# Patient Record
Sex: MALE | Race: WHITE | Employment: UNEMPLOYED | URBAN - METROPOLITAN AREA
[De-identification: names, ages, dates, MRNs, and addresses within clinical notes are randomized per-mention and may not be internally consistent; named-entity substitution may affect disease eponyms.]

---

## 2018-07-18 ENCOUNTER — OFFICE VISIT - HEALTHEAST (OUTPATIENT)
Dept: INTERNAL MEDICINE | Facility: CLINIC | Age: 38
End: 2018-07-18

## 2018-07-18 DIAGNOSIS — Z79.899 MEDICAL CANNABIS USE: ICD-10-CM

## 2018-07-18 DIAGNOSIS — G57.71 COMPLEX REGIONAL PAIN SYNDROME TYPE 2 OF RIGHT LOWER EXTREMITY: ICD-10-CM

## 2018-07-18 ASSESSMENT — MIFFLIN-ST. JEOR: SCORE: 1537.42

## 2018-09-20 ENCOUNTER — COMMUNICATION - HEALTHEAST (OUTPATIENT)
Dept: INTERNAL MEDICINE | Facility: CLINIC | Age: 38
End: 2018-09-20

## 2020-03-01 ENCOUNTER — HEALTH MAINTENANCE LETTER (OUTPATIENT)
Age: 40
End: 2020-03-01

## 2020-12-14 ENCOUNTER — HEALTH MAINTENANCE LETTER (OUTPATIENT)
Age: 40
End: 2020-12-14

## 2021-04-18 ENCOUNTER — HEALTH MAINTENANCE LETTER (OUTPATIENT)
Age: 41
End: 2021-04-18

## 2021-06-01 VITALS — HEIGHT: 68 IN | BODY MASS INDEX: 22.58 KG/M2 | WEIGHT: 149 LBS
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
## 2021-10-02 ENCOUNTER — HEALTH MAINTENANCE LETTER (OUTPATIENT)
Age: 41
End: 2021-10-02

## 2022-05-14 ENCOUNTER — HEALTH MAINTENANCE LETTER (OUTPATIENT)
Age: 42
End: 2022-05-14

## 2022-09-03 ENCOUNTER — HEALTH MAINTENANCE LETTER (OUTPATIENT)
Age: 42
End: 2022-09-03

## 2023-06-03 ENCOUNTER — HEALTH MAINTENANCE LETTER (OUTPATIENT)
Age: 43
End: 2023-06-03

## 2023-11-06 ENCOUNTER — HOSPITAL ENCOUNTER (EMERGENCY)
Facility: CLINIC | Age: 43
Discharge: LEFT BEFORE DISCHARGE | End: 2023-11-06
Attending: FAMILY MEDICINE | Admitting: FAMILY MEDICINE
Payer: COMMERCIAL

## 2023-11-06 ENCOUNTER — APPOINTMENT (OUTPATIENT)
Dept: GENERAL RADIOLOGY | Facility: CLINIC | Age: 43
End: 2023-11-06
Attending: FAMILY MEDICINE
Payer: COMMERCIAL

## 2023-11-06 VITALS
TEMPERATURE: 97.6 F | SYSTOLIC BLOOD PRESSURE: 154 MMHG | WEIGHT: 170 LBS | HEIGHT: 69 IN | OXYGEN SATURATION: 98 % | HEART RATE: 77 BPM | BODY MASS INDEX: 25.18 KG/M2 | DIASTOLIC BLOOD PRESSURE: 101 MMHG

## 2023-11-06 DIAGNOSIS — R06.00 DYSPNEA, UNSPECIFIED TYPE: ICD-10-CM

## 2023-11-06 DIAGNOSIS — Z53.29 LEFT AGAINST MEDICAL ADVICE: ICD-10-CM

## 2023-11-06 PROBLEM — G47.01 INSOMNIA DUE TO MEDICAL CONDITION: Status: ACTIVE | Noted: 2019-03-01

## 2023-11-06 PROBLEM — G56.03 BILATERAL CARPAL TUNNEL SYNDROME: Status: ACTIVE | Noted: 2020-03-27

## 2023-11-06 PROBLEM — G25.81 RESTLESS LEGS: Status: ACTIVE | Noted: 2019-03-01

## 2023-11-06 PROBLEM — M19.041 OSTEOARTHRITIS OF FINGERS OF BOTH HANDS: Status: ACTIVE | Noted: 2021-07-01

## 2023-11-06 PROBLEM — Z79.899 MEDICAL CANNABIS USE: Status: ACTIVE | Noted: 2018-07-18

## 2023-11-06 PROBLEM — F33.9 RECURRENT MAJOR DEPRESSION (H): Status: ACTIVE | Noted: 2023-11-06

## 2023-11-06 PROBLEM — G89.4 CHRONIC PAIN DISORDER: Status: ACTIVE | Noted: 2019-03-01

## 2023-11-06 PROBLEM — G57.71 COMPLEX REGIONAL PAIN SYNDROME TYPE 2 OF RIGHT LOWER EXTREMITY: Status: ACTIVE | Noted: 2018-07-18

## 2023-11-06 PROBLEM — M19.042 OSTEOARTHRITIS OF FINGERS OF BOTH HANDS: Status: ACTIVE | Noted: 2021-07-01

## 2023-11-06 LAB
ALBUMIN SERPL BCG-MCNC: 4.7 G/DL (ref 3.5–5.2)
ALP SERPL-CCNC: 129 U/L (ref 40–129)
ALT SERPL W P-5'-P-CCNC: 31 U/L (ref 0–70)
ANION GAP SERPL CALCULATED.3IONS-SCNC: 8 MMOL/L (ref 7–15)
AST SERPL W P-5'-P-CCNC: 26 U/L (ref 0–45)
BASOPHILS # BLD AUTO: 0.1 10E3/UL (ref 0–0.2)
BASOPHILS NFR BLD AUTO: 0 %
BILIRUB SERPL-MCNC: 0.3 MG/DL
BUN SERPL-MCNC: 9.2 MG/DL (ref 6–20)
CALCIUM SERPL-MCNC: 8.8 MG/DL (ref 8.6–10)
CHLORIDE SERPL-SCNC: 99 MMOL/L (ref 98–107)
CREAT SERPL-MCNC: 0.73 MG/DL (ref 0.67–1.17)
DEPRECATED HCO3 PLAS-SCNC: 28 MMOL/L (ref 22–29)
EGFRCR SERPLBLD CKD-EPI 2021: >90 ML/MIN/1.73M2
EOSINOPHIL # BLD AUTO: 0.3 10E3/UL (ref 0–0.7)
EOSINOPHIL NFR BLD AUTO: 3 %
ERYTHROCYTE [DISTWIDTH] IN BLOOD BY AUTOMATED COUNT: 12.7 % (ref 10–15)
FLUAV RNA SPEC QL NAA+PROBE: NEGATIVE
FLUBV RNA RESP QL NAA+PROBE: NEGATIVE
GLUCOSE SERPL-MCNC: 82 MG/DL (ref 70–99)
HCT VFR BLD AUTO: 44.5 % (ref 40–53)
HGB BLD-MCNC: 14.8 G/DL (ref 13.3–17.7)
HOLD SPECIMEN: NORMAL
IMM GRANULOCYTES # BLD: 0 10E3/UL
IMM GRANULOCYTES NFR BLD: 0 %
LYMPHOCYTES # BLD AUTO: 2 10E3/UL (ref 0.8–5.3)
LYMPHOCYTES NFR BLD AUTO: 18 %
MCH RBC QN AUTO: 32 PG (ref 26.5–33)
MCHC RBC AUTO-ENTMCNC: 33.3 G/DL (ref 31.5–36.5)
MCV RBC AUTO: 96 FL (ref 78–100)
MONOCYTES # BLD AUTO: 0.9 10E3/UL (ref 0–1.3)
MONOCYTES NFR BLD AUTO: 8 %
NEUTROPHILS # BLD AUTO: 8 10E3/UL (ref 1.6–8.3)
NEUTROPHILS NFR BLD AUTO: 71 %
NRBC # BLD AUTO: 0 10E3/UL
NRBC BLD AUTO-RTO: 0 /100
PLATELET # BLD AUTO: 364 10E3/UL (ref 150–450)
POTASSIUM SERPL-SCNC: 4 MMOL/L (ref 3.4–5.3)
PROT SERPL-MCNC: 7.6 G/DL (ref 6.4–8.3)
RBC # BLD AUTO: 4.62 10E6/UL (ref 4.4–5.9)
RSV RNA SPEC NAA+PROBE: NEGATIVE
SARS-COV-2 RNA RESP QL NAA+PROBE: NEGATIVE
SODIUM SERPL-SCNC: 135 MMOL/L (ref 135–145)
TROPONIN T SERPL HS-MCNC: 17 NG/L
WBC # BLD AUTO: 11.3 10E3/UL (ref 4–11)

## 2023-11-06 PROCEDURE — 99284 EMERGENCY DEPT VISIT MOD MDM: CPT | Mod: 25 | Performed by: FAMILY MEDICINE

## 2023-11-06 PROCEDURE — 84484 ASSAY OF TROPONIN QUANT: CPT | Performed by: FAMILY MEDICINE

## 2023-11-06 PROCEDURE — 99285 EMERGENCY DEPT VISIT HI MDM: CPT | Mod: 25

## 2023-11-06 PROCEDURE — 36415 COLL VENOUS BLD VENIPUNCTURE: CPT | Performed by: FAMILY MEDICINE

## 2023-11-06 PROCEDURE — 85025 COMPLETE CBC W/AUTO DIFF WBC: CPT | Performed by: FAMILY MEDICINE

## 2023-11-06 PROCEDURE — 80053 COMPREHEN METABOLIC PANEL: CPT | Performed by: FAMILY MEDICINE

## 2023-11-06 PROCEDURE — 93010 ELECTROCARDIOGRAM REPORT: CPT | Performed by: FAMILY MEDICINE

## 2023-11-06 PROCEDURE — 87637 SARSCOV2&INF A&B&RSV AMP PRB: CPT | Performed by: FAMILY MEDICINE

## 2023-11-06 PROCEDURE — 71045 X-RAY EXAM CHEST 1 VIEW: CPT

## 2023-11-06 PROCEDURE — 93005 ELECTROCARDIOGRAM TRACING: CPT

## 2023-11-06 ASSESSMENT — ACTIVITIES OF DAILY LIVING (ADL): ADLS_ACUITY_SCORE: 35

## 2023-11-06 NOTE — ED PROVIDER NOTES
History     Chief Complaint   Patient presents with    Shortness of Breath    Head Injury     HPI  Kayy Vieyra is a 43 year old male, past medical history is significant for chronic pain disorder, insomnia, restless legs, complex regional pain syndrome type II right lower extremity, medical cannabis use, chronic neck pain, tobacco abuse carpal tunnel syndrome, presents to the emergency department concerns of shortness of breath.  History is obtained from the patient presents with significant other.  He reports the onset of right-sided chest pain and shortness of breath beginning yesterday after doing some minimally exerting work at home.  No associated nausea or vomiting.  Some lightheadedness.  Significant other states that he used his inhaler at home a few times yesterday and it did not seem to help, he would not come in last night when she tried to make him.  He slept on the couch and apparently when sleeping fell off the couch and abraded the right side of his forehead without loss of consciousness.  He has not tried any medication for the pain in his right chest.  He reports that he does get occasional attacks where he gets short of breath and gets right-sided chest pain and states that its been suspected to be related to diagnosis of COPD.  He notes no change in baseline cough.  Denies any fever chills or sweats.  No change in sputum productivity.  Unfortunately he continues to smoke.      Allergies:  No Known Allergies    Problem List:    Patient Active Problem List    Diagnosis Date Noted    Osteoarthritis of fingers of both hands 07/01/2021     Priority: Medium    Bilateral carpal tunnel syndrome 03/27/2020     Priority: Medium    Chronic pain disorder 03/01/2019     Priority: Medium    Insomnia due to medical condition 03/01/2019     Priority: Medium    Restless legs 03/01/2019     Priority: Medium    Complex regional pain syndrome type 2 of right lower extremity 07/18/2018     Priority: Medium     Medical cannabis use 07/18/2018     Priority: Medium     Formatting of this note might be different from the original. Certified today      Chronic neck pain 07/01/2014     Priority: Medium    Vertebral compression fracture (H) 07/01/2014     Priority: Medium     Formatting of this note might be different from the original. Thoracic      Tobacco abuse: 1 ppd since 1993 10/03/2011     Priority: Medium    S/P appendectomy: July 15, 2011 10/03/2011     Priority: Medium    Lipoma of skin: on the back and arm 10/03/2011     Priority: Medium    Hand pain and numbness, bilateral 10/03/2011     Priority: Medium    CTS (carpal tunnel syndrome) 10/03/2011     Priority: Medium        Past Medical History:    No past medical history on file.    Past Surgical History:    Past Surgical History:   Procedure Laterality Date    APPENDECTOMY OPEN  July 2011    lipoma removal         Family History:    Family History   Problem Relation Age of Onset    Arthritis Mother         RA    Unknown/Adopted Maternal Grandmother     Unknown/Adopted Maternal Grandfather     Unknown/Adopted Paternal Grandmother     Unknown/Adopted Paternal Grandfather     Asthma No family hx of     C.A.D. No family hx of     Diabetes No family hx of     Hypertension No family hx of     Cerebrovascular Disease No family hx of     Breast Cancer No family hx of     Cancer - colorectal No family hx of     Prostate Cancer No family hx of     Alzheimer Disease No family hx of     Blood Disease No family hx of     Cancer No family hx of     Cardiovascular No family hx of     Circulatory No family hx of     Eye Disorder No family hx of     Gastrointestinal Disease No family hx of     Genitourinary Problems No family hx of     Heart Disease No family hx of     Lipids No family hx of     Musculoskeletal Disorder No family hx of     Neurologic Disorder No family hx of     Respiratory No family hx of     Thyroid Disease No family hx of        Social History:  Marital Status:   "Single [1]  Social History     Tobacco Use    Smoking status: Every Day     Packs/day: 1     Types: Cigarettes    Smokeless tobacco: Never    Tobacco comments:     1 pack per day   Substance Use Topics    Alcohol use: No    Drug use: No        Medications:    hydrocodone-acetaminophen 5-325 MG per tablet  Naproxen Sodium (ALEVE PO)          Review of Systems   All other systems reviewed and are negative.      Physical Exam   BP: (!) 154/101  Pulse: 77  Temp: 97.6  F (36.4  C)  Height: 175.3 cm (5' 9\")  Weight: 77.1 kg (170 lb)  SpO2: 98 %      Physical Exam  Vitals and nursing note reviewed.   Constitutional:       General: He is not in acute distress.     Appearance: He is well-developed and normal weight. He is not ill-appearing.   HENT:      Head: Normocephalic and atraumatic.      Mouth/Throat:      Mouth: Mucous membranes are moist.      Pharynx: Oropharynx is clear.   Eyes:      Extraocular Movements: Extraocular movements intact.      Pupils: Pupils are equal, round, and reactive to light.   Cardiovascular:      Rate and Rhythm: Normal rate and regular rhythm.   Pulmonary:      Effort: Pulmonary effort is normal.      Breath sounds: Normal breath sounds.   Chest:      Chest wall: Tenderness present.          Comments: Tenderness to palpation without crepitance in the area demarcated.  Musculoskeletal:         General: Normal range of motion.      Cervical back: Normal range of motion and neck supple.   Skin:     General: Skin is warm and dry.      Capillary Refill: Capillary refill takes less than 2 seconds.   Neurological:      General: No focal deficit present.      Mental Status: He is alert and oriented to person, place, and time.   Psychiatric:         Mood and Affect: Mood normal.         Behavior: Behavior normal.         ED Course                 Procedures              EKG Interpretation:      Interpreted by Jus Rocha MD  Time reviewed:              Results for orders placed or performed " during the hospital encounter of 11/06/23 (from the past 24 hour(s))   Symptomatic Influenza A/B, RSV, & SARS-CoV2 PCR (COVID-19) Nose    Specimen: Nose; Swab   Result Value Ref Range    Influenza A PCR Negative Negative    Influenza B PCR Negative Negative    RSV PCR Negative Negative    SARS CoV2 PCR Negative Negative    Narrative    Testing was performed using the Xpert Xpress CoV2/Flu/RSV Assay on the [employer] GeneXpert Instrument. This test should be ordered for the detection of SARS-CoV-2, influenza, and RSV viruses in individuals who meet clinical and/or epidemiological criteria. Test performance is unknown in asymptomatic patients. This test is for in vitro diagnostic use under the FDA EUA for laboratories certified under CLIA to perform high or moderate complexity testing. This test has not been FDA cleared or approved. A negative result does not rule out the presence of PCR inhibitors in the specimen or target RNA in concentration below the limit of detection for the assay. If only one viral target is positive but coinfection with multiple targets is suspected, the sample should be re-tested with another FDA cleared, approved, or authorized test, if coinfection would change clinical management. This test was validated by the [facility]. These laboratories are certified under the Clinical Laboratory Improvement Amendments of 1988 (CLIA-88) as qualified to perform high complexity laboratory testing.   CBC with platelets, differential    Narrative    The following orders were created for panel order CBC with platelets, differential.  Procedure                               Abnormality         Status                     ---------                               -----------         ------                     CBC with platelets and d...[[id number]]  Abnormal            Final result                 Please view results for these tests on the individual orders.   Comprehensive metabolic panel    Result Value Ref Range    Sodium 135 135 - 145 mmol/L    Potassium 4.0 3.4 - 5.3 mmol/L    Carbon Dioxide (CO2) 28 22 - 29 mmol/L    Anion Gap 8 7 - 15 mmol/L    Urea Nitrogen 9.2 6.0 - 20.0 mg/dL    Creatinine 0.73 0.67 - 1.17 mg/dL    GFR Estimate >90 >60 mL/min/1.73m2    Calcium 8.8 8.6 - 10.0 mg/dL    Chloride 99 98 - 107 mmol/L    Glucose 82 70 - 99 mg/dL    Alkaline Phosphatase 129 40 - 129 U/L    AST 26 0 - 45 U/L    ALT 31 0 - 70 U/L    Protein Total 7.6 6.4 - 8.3 g/dL    Albumin 4.7 3.5 - 5.2 g/dL    Bilirubin Total 0.3 <=1.2 mg/dL   Troponin T, High Sensitivity   Result Value Ref Range    Troponin T, High Sensitivity 17 <=22 ng/L   CBC with platelets and differential   Result Value Ref Range    WBC Count 11.3 (H) 4.0 - 11.0 10e3/uL    RBC Count 4.62 4.40 - 5.90 10e6/uL    Hemoglobin 14.8 13.3 - 17.7 g/dL    Hematocrit 44.5 40.0 - 53.0 %    MCV 96 78 - 100 fL    MCH 32.0 26.5 - 33.0 pg    MCHC 33.3 31.5 - 36.5 g/dL    RDW 12.7 10.0 - 15.0 %    Platelet Count 364 150 - 450 10e3/uL    % Neutrophils 71 %    % Lymphocytes 18 %    % Monocytes 8 %    % Eosinophils 3 %    % Basophils 0 %    % Immature Granulocytes 0 %    NRBCs per 100 WBC 0 <1 /100    Absolute Neutrophils 8.0 1.6 - 8.3 10e3/uL    Absolute Lymphocytes 2.0 0.8 - 5.3 10e3/uL    Absolute Monocytes 0.9 0.0 - 1.3 10e3/uL    Absolute Eosinophils 0.3 0.0 - 0.7 10e3/uL    Absolute Basophils 0.1 0.0 - 0.2 10e3/uL    Absolute Immature Granulocytes 0.0 <=0.4 10e3/uL    Absolute NRBCs 0.0 10e3/uL   [facility] Draw    Narrative    The following orders were created for panel order [facility] Draw.  Procedure                               Abnormality         Status                     ---------                               -----------         ------                     Extra Blue Top Tube[[id number]]                              Final result                 Please view results for these tests on the individual orders.   Extra Blue Top Tube   Result Value Ref Range     Hold Specimen [facility]    XR Chest Port 1 View    Narrative    XR CHEST PORT 1 VIEW 11/6/2023 1:35 PM    HISTORY: Shortness of air    COMPARISON: None.      Impression    IMPRESSION: Low lung volumes. 1.1 cm left basilar nodular opacity is  likely an incidental nipple shadow. Recommend nonemergent x-ray  follow-up with nipple markers. The lungs are otherwise clear. No  pneumonic consolidation or pleural effusion. Normal heart size. Spinal  degenerative changes.    LEANNA MYERS MD         SYSTEM ID:  [id number]     2:23 PM  Informed by the patient's nurse that the patient needs to leave.  He is demanding that his IV be removed and he wants to go right now.    Medications - No data to display    Assessments & Plan (with Medical Decision Making)     I have reviewed the nursing notes.    I have reviewed the findings, diagnosis, plan and need for follow up with the patient.          New Prescriptions    No medications on file       Final diagnoses:   Dyspnea, unspecified type   Left against medical advice       11/6/2023   [facility] EMERGENCY DEPT       Jus Rocha MD  11/15/23 5318

## 2023-11-06 NOTE — ED NOTES
Patient reports feeling depressed, hopeless. Is not taking any medications for this and would like this also addressed today. Denies suicidal ideation or homicidal ideation.

## 2023-11-06 NOTE — ED TRIAGE NOTES
Patient reports hx of copd diagnosis a year ago, last night had sudden onset of shortness of breath. Today fell of couch while sleeping and has hematoma and abrasion to right side of forehead. Patient also reports right sided chest pain.     Triage Assessment (Adult)       Row Name 11/06/23 5304          Triage Assessment    Airway WDL WDL        Respiratory WDL    Respiratory WDL X;rhythm/pattern     Rhythm/Pattern, Respiratory shortness of breath        Skin Circulation/Temperature WDL    Skin Circulation/Temperature WDL WDL        Cardiac WDL    Cardiac WDL WDL        Peripheral/Neurovascular WDL    Peripheral Neurovascular WDL WDL        Cognitive/Neuro/Behavioral WDL    Cognitive/Neuro/Behavioral WDL WDL

## 2023-11-06 NOTE — DISCHARGE INSTRUCTIONS
Aftercare Plan      Recommendations:  Coordinators from [facility] ([facility]) will be calling you in the next 24-48 hours to ensure that you have the resources you need. For additonal need of mental health services, you can also contact [facility] coordinators directly at [phone].    1) Continue to follow up with established providers for individual therapy and marriage and family therapy.  2) [facility] has scheduled patient for psychiatry. Please call to confirm the appointment and verify insurance.  3) Call crisis lines ([phone]) if mental health symptoms worsen, including suicidal ideation.  4) Mental Health symptoms and firearms create a high risk situation for harm to self or others. [facility] recommends patient to have someone outside of the home, (family or friend) to take custody of of firearms for the duration of mental health crisis.     Scheduled Appointments:    Date: Wednesday, 11/8/2023  Time: 9:00 am - 10:00 am  Provider: Stoney ROSAS  CNP,RN  Location: [facility], [address], [address], [place] [address]  Phone: [phone]  Type: Telepsychiatry    Appointment Instructions  Please make sure patient has a device (smartphone, tablet, computer, etc.) that can handle video calls. Once patient is put on schedule, ask them to fill New Patient Form by using following link: [email or web address] at least 24 hours prior to appointment.  Please fill New Patient Form by using following link. All forms need to be completed 24hours prior to the appointment date/time by going to [email or web address] Please call us on [phone] 24 hours prior to your scheduled appointment to confirm that you are able to attend.    If I am feeling unsafe or I am in a crisis, I will:    - Contact my established care providers   - Call the National Suicide Prevention Lifeline: 652.725.4091   - [place] CRISIS TEXT Line 100378  - Go to the nearest emergency room   - Continue  reaching out for help from others  - Call 911 or 988 or 211    Warning signs that I or other people might notice when a crisis is developing for me:    - feeling sudden increase in frustration, anger, or irritation  - persistent worsening of depression and anxiety  - extreme feelings of shame/guilt or feeling like a burden to others  - feeling like there is no way out  - intrusive thoughts of suicide or self harm    Things that help me to feel better:  - engaging in personal hobby or interests  - spending quality time with friends or family  - listen to soothing music     People and social settings that provide distraction:  - spending quality time with trusted friend  - asking for help as needed. Talking with recovery sponsor  - attending NA recovery meetings    Things I can use or do for distraction:   - watching a move or TV  - reading a book  - listening to music  - take a nature walk    People whom I can ask for help:  - trusted family member, coworker, or friend  - Crisis   - outpatient providers  - sponsor or NA friends    Your [facility] has a mental health crisis team you can call 24/7: [facility] Crisis  [phone]    Text [place] to 412373 to be connected with mental health crisis support.    [facility]  Are you an adult needing support? Talk to a specialist who has firsthand experience living with a mental health condition: Call: [phone]    Text: SUPPORT TO 18656     [place] [facility]  [email or web address]  [address] [place] [address]  [phone]    [facility]  Toll Free: [phone]  Direct: [phone]    Crisis Lines  Crisis Text Line  Text 829336  You will be connected with a trained live crisis counselor to provide support.    Por espanol, texto  AMBREEN a 264152 o texto a 442-AYUDAME en [facility] Hope Line  1.800.SUICIDE [3618426]      Additional Information  Today you were seen by a licensed mental health  professional through Triage and Transition services, [facility] ([facility])  for a crisis assessment in the Emergency Department at [facility].  It is recommended that you follow up with your established providers (psychiatrist, mental health therapist, and/or primary care doctor - as relevant) as soon as possible.     Coordinators from [facility] will be calling you in the next 24-48 hours to ensure that you have the resources you need.  You can also contact [facility] coordinators directly at [phone]. You may have been scheduled for or offered an appointment with a mental health provider. [facility] maintains an extensive network of licensed behavioral health providers to connect patients with the services they need.  We do not charge providers a fee to participate in our referral network.  We match patients with providers based on a patient's specific needs, insurance coverage, and location.  Our first effort will be to refer you to a provider within your care system, and will utilize providers outside your care system as needed.

## 2023-11-06 NOTE — ED NOTES
"Patient out to desk upset, stated \"I want this IV out now, or I will take it out myself, and I want to go!\" This RN called patient assigned RN in addition to MD -Dr Rocha, patient okay to be discharged per MD. IV removed and patient given discharge instructions. Patient apologized to this RN for \"being rude and abrupt, patient left with family member.  "
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
## 2023-11-07 ENCOUNTER — HOSPITAL ENCOUNTER (EMERGENCY)
Facility: CLINIC | Age: 43
Discharge: STILL ADMITTED OR ADMINISTRATIVE | End: 2023-11-07
Payer: COMMERCIAL

## 2023-11-11 ENCOUNTER — HOSPITAL ENCOUNTER (EMERGENCY)
Facility: CLINIC | Age: 43
Discharge: HOME | End: 2023-11-11
Attending: EMERGENCY MEDICINE | Admitting: EMERGENCY MEDICINE
Payer: COMMERCIAL

## 2023-11-11 ENCOUNTER — APPOINTMENT (OUTPATIENT)
Dept: GENERAL RADIOLOGY | Facility: CLINIC | Age: 43
End: 2023-11-11
Attending: EMERGENCY MEDICINE
Payer: COMMERCIAL

## 2023-11-11 VITALS
DIASTOLIC BLOOD PRESSURE: 70 MMHG | HEART RATE: 75 BPM | TEMPERATURE: 97.7 F | RESPIRATION RATE: 15 BRPM | HEIGHT: 69 IN | OXYGEN SATURATION: 97 % | WEIGHT: 162 LBS | BODY MASS INDEX: 23.99 KG/M2 | SYSTOLIC BLOOD PRESSURE: 115 MMHG

## 2023-11-11 DIAGNOSIS — R07.9 CHEST PAIN, UNSPECIFIED TYPE: ICD-10-CM

## 2023-11-11 LAB
ALBUMIN SERPL BCG-MCNC: 3.8 G/DL (ref 3.5–5.2)
ALP SERPL-CCNC: 108 U/L (ref 40–129)
ALT SERPL W P-5'-P-CCNC: 19 U/L (ref 0–70)
ANION GAP SERPL CALCULATED.3IONS-SCNC: 10 MMOL/L (ref 7–15)
AST SERPL W P-5'-P-CCNC: 21 U/L (ref 0–45)
BASOPHILS # BLD AUTO: 0.1 10E3/UL (ref 0–0.2)
BASOPHILS NFR BLD AUTO: 0 %
BILIRUB SERPL-MCNC: 0.2 MG/DL
BUN SERPL-MCNC: 7.7 MG/DL (ref 6–20)
CALCIUM SERPL-MCNC: 8.7 MG/DL (ref 8.6–10)
CHLORIDE SERPL-SCNC: 100 MMOL/L (ref 98–107)
CREAT SERPL-MCNC: 0.71 MG/DL (ref 0.67–1.17)
DEPRECATED HCO3 PLAS-SCNC: 24 MMOL/L (ref 22–29)
EGFRCR SERPLBLD CKD-EPI 2021: >90 ML/MIN/1.73M2
EOSINOPHIL # BLD AUTO: 0.5 10E3/UL (ref 0–0.7)
EOSINOPHIL NFR BLD AUTO: 4 %
ERYTHROCYTE [DISTWIDTH] IN BLOOD BY AUTOMATED COUNT: 12.7 % (ref 10–15)
GLUCOSE SERPL-MCNC: 158 MG/DL (ref 70–99)
HCT VFR BLD AUTO: 37.9 % (ref 40–53)
HGB BLD-MCNC: 13.2 G/DL (ref 13.3–17.7)
IMM GRANULOCYTES # BLD: 0 10E3/UL
IMM GRANULOCYTES NFR BLD: 0 %
LYMPHOCYTES # BLD AUTO: 2.7 10E3/UL (ref 0.8–5.3)
LYMPHOCYTES NFR BLD AUTO: 23 %
MCH RBC QN AUTO: 33.2 PG (ref 26.5–33)
MCHC RBC AUTO-ENTMCNC: 34.8 G/DL (ref 31.5–36.5)
MCV RBC AUTO: 95 FL (ref 78–100)
MONOCYTES # BLD AUTO: 1.1 10E3/UL (ref 0–1.3)
MONOCYTES NFR BLD AUTO: 10 %
NEUTROPHILS # BLD AUTO: 7.3 10E3/UL (ref 1.6–8.3)
NEUTROPHILS NFR BLD AUTO: 63 %
NRBC # BLD AUTO: 0 10E3/UL
NRBC BLD AUTO-RTO: 0 /100
PLATELET # BLD AUTO: 298 10E3/UL (ref 150–450)
POTASSIUM SERPL-SCNC: 3.8 MMOL/L (ref 3.4–5.3)
PROT SERPL-MCNC: 6.6 G/DL (ref 6.4–8.3)
RBC # BLD AUTO: 3.98 10E6/UL (ref 4.4–5.9)
SODIUM SERPL-SCNC: 134 MMOL/L (ref 135–145)
TROPONIN T SERPL HS-MCNC: 6 NG/L
TROPONIN T SERPL HS-MCNC: <6 NG/L
WBC # BLD AUTO: 11.7 10E3/UL (ref 4–11)

## 2023-11-11 PROCEDURE — 250N000013 HC RX MED GY IP 250 OP 250 PS 637: Performed by: EMERGENCY MEDICINE

## 2023-11-11 PROCEDURE — 93010 ELECTROCARDIOGRAM REPORT: CPT | Mod: 76 | Performed by: EMERGENCY MEDICINE

## 2023-11-11 PROCEDURE — 71046 X-RAY EXAM CHEST 2 VIEWS: CPT

## 2023-11-11 PROCEDURE — 36415 COLL VENOUS BLD VENIPUNCTURE: CPT | Performed by: EMERGENCY MEDICINE

## 2023-11-11 PROCEDURE — 84484 ASSAY OF TROPONIN QUANT: CPT | Performed by: EMERGENCY MEDICINE

## 2023-11-11 PROCEDURE — 93005 ELECTROCARDIOGRAM TRACING: CPT | Performed by: EMERGENCY MEDICINE

## 2023-11-11 PROCEDURE — 93005 ELECTROCARDIOGRAM TRACING: CPT | Mod: 76 | Performed by: EMERGENCY MEDICINE

## 2023-11-11 PROCEDURE — 99285 EMERGENCY DEPT VISIT HI MDM: CPT | Mod: 25 | Performed by: EMERGENCY MEDICINE

## 2023-11-11 PROCEDURE — 80053 COMPREHEN METABOLIC PANEL: CPT | Performed by: EMERGENCY MEDICINE

## 2023-11-11 PROCEDURE — 99284 EMERGENCY DEPT VISIT MOD MDM: CPT | Mod: 25 | Performed by: EMERGENCY MEDICINE

## 2023-11-11 PROCEDURE — 93010 ELECTROCARDIOGRAM REPORT: CPT | Performed by: EMERGENCY MEDICINE

## 2023-11-11 PROCEDURE — 85025 COMPLETE CBC W/AUTO DIFF WBC: CPT | Performed by: EMERGENCY MEDICINE

## 2023-11-11 RX ORDER — ASPIRIN 81 MG/1
324 TABLET, CHEWABLE ORAL ONCE
Status: COMPLETED | OUTPATIENT
Start: 2023-11-11 | End: 2023-11-11

## 2023-11-11 RX ORDER — NITROGLYCERIN 0.4 MG/1
0.4 TABLET SUBLINGUAL EVERY 5 MIN PRN
Status: DISCONTINUED | OUTPATIENT
Start: 2023-11-11 | End: 2023-11-11 | Stop reason: HOSPADM

## 2023-11-11 RX ORDER — MAGNESIUM HYDROXIDE/ALUMINUM HYDROXICE/SIMETHICONE 120; 1200; 1200 MG/30ML; MG/30ML; MG/30ML
30 SUSPENSION ORAL ONCE
Status: COMPLETED | OUTPATIENT
Start: 2023-11-11 | End: 2023-11-11

## 2023-11-11 RX ADMIN — ALUMINUM HYDROXIDE, MAGNESIUM HYDROXIDE, AND SIMETHICONE 30 ML: 200; 200; 20 SUSPENSION ORAL at 02:04

## 2023-11-11 RX ADMIN — ASPIRIN 81 MG CHEWABLE TABLET 324 MG: 81 TABLET CHEWABLE at 01:47

## 2023-11-11 RX ADMIN — NITROGLYCERIN 0.4 MG: 0.4 TABLET SUBLINGUAL at 02:05

## 2023-11-11 RX ADMIN — NITROGLYCERIN 0.4 MG: 0.4 TABLET SUBLINGUAL at 01:47

## 2023-11-11 RX ADMIN — NITROGLYCERIN 0.4 MG: 0.4 TABLET SUBLINGUAL at 02:17

## 2023-11-11 ASSESSMENT — ENCOUNTER SYMPTOMS
COUGH: 0
CHEST TIGHTNESS: 0
SHORTNESS OF BREATH: 1
BACK PAIN: 0
HEADACHES: 0
LIGHT-HEADEDNESS: 0
VOMITING: 0
PALPITATIONS: 1
ABDOMINAL PAIN: 0
NAUSEA: 0
FEVER: 0
CHILLS: 0
DIAPHORESIS: 0
APPETITE CHANGE: 0

## 2023-11-11 ASSESSMENT — ACTIVITIES OF DAILY LIVING (ADL)
ADLS_ACUITY_SCORE: 35
ADLS_ACUITY_SCORE: 35

## 2023-11-11 NOTE — ED PROVIDER NOTES
History     Chief Complaint   Patient presents with    Chest Pain     HPI  Kayy Vieyra is a 43 year old male smoker presented for evaluation of chest pain.  Patient reports he was laying in bed with his wife when he developed sudden onset of palpitations and racing heart sensation followed by central chest pressure sensation.  This pressure in his chest radiated into his left-sided neck and had a tingling sensation to his left arm.  This discomfort in his chest was quite intense along with a rapid heart beat.  Patient came in for evaluation.  On the way here he had improvement in his symptoms.  Pains did seem to worsen when he would get up and walk and subside when he would rest.  In the ED laying in bed he reports pain is dramatically improved.  Does report some soreness of his chest.  Was asymptomatic during the day today.  Denies previous similar symptoms in the past.  No cough or upper respiratory infectious symptoms.  Denies difficulty breathing although his breathing was hard when the pain and rapid heart initially came on.  Denies feeling sweaty but did feel warm sensation across his body when the symptoms began.  Denies any nausea.  No known history of heart disease in himself or early heart disease in his family.    Allergies:  No Known Allergies    Problem List:    Patient Active Problem List    Diagnosis Date Noted    Recurrent major depression (H24) 11/06/2023     Priority: Medium    Osteoarthritis of fingers of both hands 07/01/2021     Priority: Medium    Bilateral carpal tunnel syndrome 03/27/2020     Priority: Medium    Chronic pain disorder 03/01/2019     Priority: Medium    Insomnia due to medical condition 03/01/2019     Priority: Medium    Restless legs 03/01/2019     Priority: Medium    Complex regional pain syndrome type 2 of right lower extremity 07/18/2018     Priority: Medium    Medical cannabis use 07/18/2018     Priority: Medium     Formatting of this note might be different from  the original. Certified today      Chronic neck pain 07/01/2014     Priority: Medium    Vertebral compression fracture (H) 07/01/2014     Priority: Medium     Formatting of this note might be different from the original. Thoracic      Tobacco abuse: 1 ppd since 1993 10/03/2011     Priority: Medium    S/P appendectomy: July 15, 2011 10/03/2011     Priority: Medium    Lipoma of skin: on the back and arm 10/03/2011     Priority: Medium    Hand pain and numbness, bilateral 10/03/2011     Priority: Medium    CTS (carpal tunnel syndrome) 10/03/2011     Priority: Medium        Past Medical History:    No past medical history on file.    Past Surgical History:    Past Surgical History:   Procedure Laterality Date    APPENDECTOMY OPEN  July 2011    lipoma removal         Family History:    Family History   Problem Relation Age of Onset    Arthritis Mother         RA    Unknown/Adopted Maternal Grandmother     Unknown/Adopted Maternal Grandfather     Unknown/Adopted Paternal Grandmother     Unknown/Adopted Paternal Grandfather     Asthma No family hx of     C.A.D. No family hx of     Diabetes No family hx of     Hypertension No family hx of     Cerebrovascular Disease No family hx of     Breast Cancer No family hx of     Cancer - colorectal No family hx of     Prostate Cancer No family hx of     Alzheimer Disease No family hx of     Blood Disease No family hx of     Cancer No family hx of     Cardiovascular No family hx of     Circulatory No family hx of     Eye Disorder No family hx of     Gastrointestinal Disease No family hx of     Genitourinary Problems No family hx of     Heart Disease No family hx of     Lipids No family hx of     Musculoskeletal Disorder No family hx of     Neurologic Disorder No family hx of     Respiratory No family hx of     Thyroid Disease No family hx of        Social History:  Marital Status:  Single [1]  Social History     Tobacco Use    Smoking status: Every Day     Packs/day: 1     Types:  "Cigarettes    Smokeless tobacco: Never    Tobacco comments:     1 pack per day   Substance Use Topics    Alcohol use: No    Drug use: No        Medications:    hydrocodone-acetaminophen 5-325 MG per tablet  Naproxen Sodium (ALEVE PO)          Review of Systems   Constitutional:  Negative for appetite change, chills, diaphoresis and fever.   HENT:  Negative for congestion.    Respiratory:  Positive for shortness of breath. Negative for cough and chest tightness.    Cardiovascular:  Positive for chest pain and palpitations. Negative for leg swelling.   Gastrointestinal:  Negative for abdominal pain, nausea and vomiting.   Genitourinary:  Negative for decreased urine volume.   Musculoskeletal:  Negative for back pain.   Skin:  Negative for rash.   Neurological:  Negative for light-headedness and headaches.   All other systems reviewed and are negative.      Physical Exam   BP: (!) 141/82  Pulse: 112  Temp: 97.7  F (36.5  C)  Resp: 18  Height: 175.3 cm (5' 9\")  Weight: 73.5 kg (162 lb)  SpO2: 97 %      Physical Exam  Vitals and nursing note reviewed.   Constitutional:       Appearance: He is well-developed. He is not ill-appearing or diaphoretic.   Cardiovascular:      Rate and Rhythm: Normal rate and regular rhythm.      Heart sounds: Normal heart sounds.   Pulmonary:      Effort: Pulmonary effort is normal.      Breath sounds: Normal breath sounds.   Chest:      Chest wall: No tenderness.   Abdominal:      Palpations: Abdomen is soft.      Tenderness: There is no abdominal tenderness. There is no guarding.   Musculoskeletal:         General: Normal range of motion.      Cervical back: Normal range of motion.      Right lower leg: No tenderness. No edema.      Left lower leg: No tenderness. No edema.   Skin:     General: Skin is warm and dry.      Capillary Refill: Capillary refill takes less than 2 seconds.   Neurological:      General: No focal deficit present.      Mental Status: He is alert and oriented to person, " place, and time.   Psychiatric:         Mood and Affect: Mood is anxious.         ED Course                 Procedures              EKG Interpretation:      Interpreted by Theron Pepe MD  Time reviewed: 0100  Symptoms at time of EKG: chest pain   Rhythm: normal sinus   Rate: normal  Axis: normal  Ectopy: none  Conduction: normal  ST Segments/ T Waves: No ST-T wave changes  Q Waves: none  Comparison to prior: Unchanged    Clinical Impression: normal EKG         EKG Interpretation:      Interpreted by Theron Pepe MD  Time reviewed:0135   Symptoms at time of EKG: chest pain   Rhythm: normal sinus   Rate: normal  Axis: NORMAL  Ectopy: none  Conduction: normal  ST Segments/ T Waves: No ST-T wave changes  Q Waves: none  Comparison to prior: Unchanged    Clinical Impression: normal EKG        Results for orders placed or performed during the hospital encounter of 11/11/23 (from the past 24 hour(s))   Comprehensive metabolic panel   Result Value Ref Range    Sodium 134 (L) 135 - 145 mmol/L    Potassium 3.8 3.4 - 5.3 mmol/L    Carbon Dioxide (CO2) 24 22 - 29 mmol/L    Anion Gap 10 7 - 15 mmol/L    Urea Nitrogen 7.7 6.0 - 20.0 mg/dL    Creatinine 0.71 0.67 - 1.17 mg/dL    GFR Estimate >90 >60 mL/min/1.73m2    Calcium 8.7 8.6 - 10.0 mg/dL    Chloride 100 98 - 107 mmol/L    Glucose 158 (H) 70 - 99 mg/dL    Alkaline Phosphatase 108 40 - 129 U/L    AST 21 0 - 45 U/L    ALT 19 0 - 70 U/L    Protein Total 6.6 6.4 - 8.3 g/dL    Albumin 3.8 3.5 - 5.2 g/dL    Bilirubin Total 0.2 <=1.2 mg/dL   CBC with platelets, differential    Narrative    The following orders were created for panel order CBC with platelets, differential.  Procedure                               Abnormality         Status                     ---------                               -----------         ------                     CBC with platelets and d...[[id number]]  Abnormal            Final result                 Please view results for these  tests on the individual orders.   Troponin T, High Sensitivity   Result Value Ref Range    Troponin T, High Sensitivity <6 <=22 ng/L   CBC with platelets and differential   Result Value Ref Range    WBC Count 11.7 (H) 4.0 - 11.0 10e3/uL    RBC Count 3.98 (L) 4.40 - 5.90 10e6/uL    Hemoglobin 13.2 (L) 13.3 - 17.7 g/dL    Hematocrit 37.9 (L) 40.0 - 53.0 %    MCV 95 78 - 100 fL    MCH 33.2 (H) 26.5 - 33.0 pg    MCHC 34.8 31.5 - 36.5 g/dL    RDW 12.7 10.0 - 15.0 %    Platelet Count 298 150 - 450 10e3/uL    % Neutrophils 63 %    % Lymphocytes 23 %    % Monocytes 10 %    % Eosinophils 4 %    % Basophils 0 %    % Immature Granulocytes 0 %    NRBCs per 100 WBC 0 <1 /100    Absolute Neutrophils 7.3 1.6 - 8.3 10e3/uL    Absolute Lymphocytes 2.7 0.8 - 5.3 10e3/uL    Absolute Monocytes 1.1 0.0 - 1.3 10e3/uL    Absolute Eosinophils 0.5 0.0 - 0.7 10e3/uL    Absolute Basophils 0.1 0.0 - 0.2 10e3/uL    Absolute Immature Granulocytes 0.0 <=0.4 10e3/uL    Absolute NRBCs 0.0 10e3/uL   XR Chest 2 Views    Narrative    EXAM: XR CHEST 2 VIEWS  LOCATION: [facility]  DATE: 11/11/2023    INDICATION: chest pain  COMPARISON: 11/06/2023      Impression    IMPRESSION: Negative chest.   Troponin T, High Sensitivity   Result Value Ref Range    Troponin T, High Sensitivity 6 <=22 ng/L       Medications   nitroGLYcerin (NITROSTAT) sublingual tablet 0.4 mg (0.4 mg Sublingual $Given 11/11/23 0217)   aspirin (ASA) chewable tablet 324 mg (324 mg Oral $Given 11/11/23 0147)   alum & mag hydroxide-simethicone (MAALOX) suspension 30 mL (30 mLs Oral $Given 11/11/23 0204)     3:35 AM Patient re-assessed: Patient sleeping.  Easily awoken.  Reports complete resolution of his chest pain.  Patient states that he did not notice any significant improvement after taking the Maalox but is pain-free now.  Patient advised of reassuring work-up including second troponin which remains negative.    Assessments & Plan (with Medical Decision  Making)  43-year-old smoker presenting for evaluation of chest pain.  Patient had acute onset of central chest pressure with radiation to his neck and some tingling in his left arm prompting evaluation tonight.  Symptoms somewhat exertional and began at rest in the ED.  Denies a history of occasional pain similar to this in the past pain nearly resolved in the ED.  No respiratory symptoms.  Vitals normal in the ED.  EKG nonischemic and repeat here unchanged.  Initial troponin negative and repeat remained negative.  Chest x-ray showing no acute abnormality.  Exact cause of symptoms unclear but does not appear to be dangerous.  No evidence of heart attack.  Counseled patient to follow-up with primary care for repeat exam.  May benefit from outpatient cardiac testing.  Provided with return precautions if new or concerning symptoms develop.     I have reviewed the nursing notes.    I have reviewed the findings, diagnosis, plan and need for follow up with the patient.           New Prescriptions    No medications on file       Final diagnoses:   Chest pain, unspecified type       11/11/2023   [facility] EMERGENCY DEPT       Pepe, Theron Gupta MD  11/11/23 8592     Localized Dermabrasion Text: The patient was draped in routine manner.  Localized dermabrasion using 3 x 17 mm wire brush was performed in routine manner to papillary dermis. This spot dermabrasion is being performed to complete skin cancer reconstruction. It also will eliminate the other sun damaged precancerous cells that are known to be part of the regional effect of a lifetime's worth of sun exposure. This localized dermabrasion is therapeutic and should not be considered cosmetic in any regard. Localized Dermabrasion With Wire Brush Text: The patient was draped in routine manner.  Localized dermabrasion using 3 x 17 mm wire brush was performed in routine manner to papillary dermis. This spot dermabrasion is being performed to complete skin cancer reconstruction. It also will eliminate the other sun damaged precancerous cells that are known to be part of the regional effect of a lifetime's worth of sun exposure. This localized dermabrasion is therapeutic and should not be considered cosmetic in any regard.

## 2023-11-11 NOTE — ED TRIAGE NOTES
"Pt reports he was lying down for bed when he developed sudden, severe mid sternal chest pain with associated palpitations and \"hot and cold flashes\". Pt reports pain is still present at a 6/10, describes it as pressures.      Triage Assessment (Adult)       Row Name 11/11/23 0046          Triage Assessment    Airway WDL WDL        Respiratory WDL    Respiratory WDL WDL        Skin Circulation/Temperature WDL    Skin Circulation/Temperature WDL WDL        Cardiac WDL    Cardiac WDL X;chest pain        Chest Pain Assessment    Chest Pain Location midsternal     Chest Pain Radiation arm     Character pressure     Precipitating Factors at rest     Alleviating Factors nothing     Associated Signs/Symptoms --  \"hot and cold flashes\"        Peripheral/Neurovascular WDL    Peripheral Neurovascular WDL WDL        Cognitive/Neuro/Behavioral WDL    Cognitive/Neuro/Behavioral WDL WDL                     "

## 2023-11-11 NOTE — DISCHARGE INSTRUCTIONS
The exact cause of your chest pain is unclear.  It does not currently appear to be dangerous however, if you develop any new or concerning symptoms specifically chest pain that lasts more than 10 minutes, sweating, nausea, or trouble breathing, please return to the emergency department as soon as possible

## 2023-11-28 ENCOUNTER — APPOINTMENT (OUTPATIENT)
Dept: GENERAL RADIOLOGY | Facility: CLINIC | Age: 43
End: 2023-11-28
Attending: EMERGENCY MEDICINE
Payer: COMMERCIAL

## 2023-11-28 ENCOUNTER — HOSPITAL ENCOUNTER (EMERGENCY)
Facility: CLINIC | Age: 43
Discharge: HOME | End: 2023-11-29
Attending: EMERGENCY MEDICINE | Admitting: EMERGENCY MEDICINE
Payer: COMMERCIAL

## 2023-11-28 DIAGNOSIS — R07.9 CHEST PAIN, UNSPECIFIED TYPE: ICD-10-CM

## 2023-11-28 LAB
ALBUMIN SERPL BCG-MCNC: 4.6 G/DL (ref 3.5–5.2)
ALP SERPL-CCNC: 131 U/L (ref 40–150)
ALT SERPL W P-5'-P-CCNC: 20 U/L (ref 0–70)
ANION GAP SERPL CALCULATED.3IONS-SCNC: 12 MMOL/L (ref 7–15)
AST SERPL W P-5'-P-CCNC: 23 U/L (ref 0–45)
BASOPHILS # BLD AUTO: 0 10E3/UL (ref 0–0.2)
BASOPHILS NFR BLD AUTO: 0 %
BILIRUB SERPL-MCNC: 0.4 MG/DL
BUN SERPL-MCNC: 6.3 MG/DL (ref 6–20)
CALCIUM SERPL-MCNC: 9.2 MG/DL (ref 8.6–10)
CHLORIDE SERPL-SCNC: 94 MMOL/L (ref 98–107)
CREAT SERPL-MCNC: 0.66 MG/DL (ref 0.67–1.17)
DEPRECATED HCO3 PLAS-SCNC: 28 MMOL/L (ref 22–29)
EGFRCR SERPLBLD CKD-EPI 2021: >90 ML/MIN/1.73M2
EOSINOPHIL # BLD AUTO: 0.4 10E3/UL (ref 0–0.7)
EOSINOPHIL NFR BLD AUTO: 3 %
ERYTHROCYTE [DISTWIDTH] IN BLOOD BY AUTOMATED COUNT: 12.5 % (ref 10–15)
GLUCOSE SERPL-MCNC: 109 MG/DL (ref 70–99)
HCT VFR BLD AUTO: 42.8 % (ref 40–53)
HGB BLD-MCNC: 15.3 G/DL (ref 13.3–17.7)
IMM GRANULOCYTES # BLD: 0 10E3/UL
IMM GRANULOCYTES NFR BLD: 0 %
LYMPHOCYTES # BLD AUTO: 2.6 10E3/UL (ref 0.8–5.3)
LYMPHOCYTES NFR BLD AUTO: 22 %
MCH RBC QN AUTO: 33 PG (ref 26.5–33)
MCHC RBC AUTO-ENTMCNC: 35.7 G/DL (ref 31.5–36.5)
MCV RBC AUTO: 92 FL (ref 78–100)
MONOCYTES # BLD AUTO: 0.9 10E3/UL (ref 0–1.3)
MONOCYTES NFR BLD AUTO: 7 %
NEUTROPHILS # BLD AUTO: 7.8 10E3/UL (ref 1.6–8.3)
NEUTROPHILS NFR BLD AUTO: 68 %
NRBC # BLD AUTO: 0 10E3/UL
NRBC BLD AUTO-RTO: 0 /100
PLATELET # BLD AUTO: 342 10E3/UL (ref 150–450)
POTASSIUM SERPL-SCNC: 3.8 MMOL/L (ref 3.4–5.3)
PROT SERPL-MCNC: 7.5 G/DL (ref 6.4–8.3)
RBC # BLD AUTO: 4.63 10E6/UL (ref 4.4–5.9)
SODIUM SERPL-SCNC: 134 MMOL/L (ref 135–145)
TROPONIN T SERPL HS-MCNC: 7 NG/L
WBC # BLD AUTO: 11.8 10E3/UL (ref 4–11)

## 2023-11-28 PROCEDURE — 99284 EMERGENCY DEPT VISIT MOD MDM: CPT | Mod: 25 | Performed by: EMERGENCY MEDICINE

## 2023-11-28 PROCEDURE — 93010 ELECTROCARDIOGRAM REPORT: CPT | Performed by: EMERGENCY MEDICINE

## 2023-11-28 PROCEDURE — 84484 ASSAY OF TROPONIN QUANT: CPT | Performed by: EMERGENCY MEDICINE

## 2023-11-28 PROCEDURE — 71046 X-RAY EXAM CHEST 2 VIEWS: CPT

## 2023-11-28 PROCEDURE — 80053 COMPREHEN METABOLIC PANEL: CPT | Performed by: EMERGENCY MEDICINE

## 2023-11-28 PROCEDURE — 85025 COMPLETE CBC W/AUTO DIFF WBC: CPT | Performed by: EMERGENCY MEDICINE

## 2023-11-28 PROCEDURE — 36415 COLL VENOUS BLD VENIPUNCTURE: CPT | Performed by: EMERGENCY MEDICINE

## 2023-11-28 PROCEDURE — 93005 ELECTROCARDIOGRAM TRACING: CPT | Performed by: EMERGENCY MEDICINE

## 2023-11-28 PROCEDURE — 99285 EMERGENCY DEPT VISIT HI MDM: CPT | Mod: 25 | Performed by: EMERGENCY MEDICINE

## 2023-11-28 ASSESSMENT — ACTIVITIES OF DAILY LIVING (ADL): ADLS_ACUITY_SCORE: 35

## 2023-11-29 VITALS
OXYGEN SATURATION: 98 % | SYSTOLIC BLOOD PRESSURE: 127 MMHG | TEMPERATURE: 98.6 F | WEIGHT: 165 LBS | BODY MASS INDEX: 24.44 KG/M2 | RESPIRATION RATE: 10 BRPM | DIASTOLIC BLOOD PRESSURE: 88 MMHG | HEIGHT: 69 IN | HEART RATE: 80 BPM

## 2023-11-29 LAB
HOLD SPECIMEN: NORMAL
HOLD SPECIMEN: NORMAL

## 2023-11-29 NOTE — ED PROVIDER NOTES
ED Provider Note  [facility]      History     Chief Complaint   Patient presents with    Chest Pain     Chest pain, states overdosed on fentanyl this am, was given 3 doses of narcan and woke up to them performing chest compressions, worsening chest pain this evening.     ELAINA Vieyra is a 43 year old male who presents to the emergency department with concerns regarding left-sided chest pain.  Patient states that around noon, about 11 to 12 hours prior to ED arrival patient had overdosed on fentanyl, which he had smoked.  He has never overdosed previously.  He was noted to have 3 doses of Narcan that had been administered according to patient report.  He subsequently came to.  Did have reported chest compressions for up to 5 minutes.  Denies any shortness of breath.  Tonight, patient was sitting in bed, watching television, and began experiencing left-sided chest pain.  No lightheadedness, or dizziness.  No current shortness of breath.  No current chest pains.  Describes 2 episodes of sharp, stabbing pain, which then turned into a pressure, which then went away on its own, all within a matter of a couple minutes.  No prior cardiac events.  Patient does state that he is going to treatment facility.        Independent Historian:      Review of External Notes:  Prior records are reviewed.  Patient had been seen for chest pain on November 11.  Negative workup at that time      Allergies:  No Known Allergies    Problem List:    Patient Active Problem List    Diagnosis Date Noted    Recurrent major depression (H24) 11/06/2023     Priority: Medium    Osteoarthritis of fingers of both hands 07/01/2021     Priority: Medium    Bilateral carpal tunnel syndrome 03/27/2020     Priority: Medium    Chronic pain disorder 03/01/2019     Priority: Medium    Insomnia due to medical condition 03/01/2019     Priority: Medium    Restless legs 03/01/2019     Priority: Medium    Complex regional pain  syndrome type 2 of right lower extremity 07/18/2018     Priority: Medium    Medical cannabis use 07/18/2018     Priority: Medium     Formatting of this note might be different from the original. Certified today      Chronic neck pain 07/01/2014     Priority: Medium    Vertebral compression fracture (H) 07/01/2014     Priority: Medium     Formatting of this note might be different from the original. Thoracic      Tobacco abuse: 1 ppd since 1993 10/03/2011     Priority: Medium    S/P appendectomy: July 15, 2011 10/03/2011     Priority: Medium    Lipoma of skin: on the back and arm 10/03/2011     Priority: Medium    Hand pain and numbness, bilateral 10/03/2011     Priority: Medium    CTS (carpal tunnel syndrome) 10/03/2011     Priority: Medium        Past Medical History:    No past medical history on file.    Past Surgical History:    Past Surgical History:   Procedure Laterality Date    APPENDECTOMY OPEN  July 2011    lipoma removal         Family History:    Family History   Problem Relation Age of Onset    Arthritis Mother         RA    Unknown/Adopted Maternal Grandmother     Unknown/Adopted Maternal Grandfather     Unknown/Adopted Paternal Grandmother     Unknown/Adopted Paternal Grandfather     Asthma No family hx of     C.A.D. No family hx of     Diabetes No family hx of     Hypertension No family hx of     Cerebrovascular Disease No family hx of     Breast Cancer No family hx of     Cancer - colorectal No family hx of     Prostate Cancer No family hx of     Alzheimer Disease No family hx of     Blood Disease No family hx of     Cancer No family hx of     Cardiovascular No family hx of     Circulatory No family hx of     Eye Disorder No family hx of     Gastrointestinal Disease No family hx of     Genitourinary Problems No family hx of     Heart Disease No family hx of     Lipids No family hx of     Musculoskeletal Disorder No family hx of     Neurologic Disorder No family hx of     Respiratory No family hx of   "   Thyroid Disease No family hx of        Social History:  Marital Status:  Single [1]  Social History     Tobacco Use    Smoking status: Every Day     Packs/day: 1     Types: Cigarettes    Smokeless tobacco: Never    Tobacco comments:     1 pack per day   Substance Use Topics    Alcohol use: No    Drug use: No        Medications:    hydrocodone-acetaminophen 5-325 MG per tablet  Naproxen Sodium (ALEVE PO)          Review of Systems  A medically appropriate review of systems was performed with pertinent positives and negatives noted in the HPI, and all other systems negative.    Physical Exam   Patient Vitals for the past 24 hrs:   BP Temp Temp src Pulse Resp SpO2 Height Weight   11/29/23 0030 127/88 -- -- 80 10 98 % -- --   11/29/23 0000 133/84 -- -- 84 22 98 % -- --   11/28/23 2330 122/63 -- -- 75 15 98 % -- --   11/28/23 2315 125/87 -- -- 75 15 98 % -- --   11/28/23 2300 131/78 -- -- 87 13 99 % -- --   11/28/23 2256 (!) 142/90 98.6  F (37  C) Oral 89 16 95 % 1.753 m (5' 9\") 74.8 kg (165 lb)          Physical Exam  General: alert and in no acute distress on arrival  Head: atraumatic, normocephalic  Lungs:  nonlabored  CV:  extremities warm and perfused.  S1-S2 regular rate and rhythm  Abd: nondistended  Skin: no rashes, no diaphoresis and skin color normal  Neuro: Patient awake, alert, speech is fluent,   Psychiatric: affect/mood normal,        ED Course                 Procedures         EKG, reviewed by myself shows normal sinus rhythm.  Rate 83 bpm.  No ectopy.  No acute ischemic appearing changes                 Results for orders placed or performed during the hospital encounter of 11/28/23 (from the past 24 hour(s))   CBC with platelets, differential    Narrative    The following orders were created for panel order CBC with platelets, differential.  Procedure                               Abnormality         Status                     ---------                               -----------         ------         "             CBC with platelets and d...[[id number]]  Abnormal            Final result                 Please view results for these tests on the individual orders.   Comprehensive metabolic panel   Result Value Ref Range    Sodium 134 (L) 135 - 145 mmol/L    Potassium 3.8 3.4 - 5.3 mmol/L    Carbon Dioxide (CO2) 28 22 - 29 mmol/L    Anion Gap 12 7 - 15 mmol/L    Urea Nitrogen 6.3 6.0 - 20.0 mg/dL    Creatinine 0.66 (L) 0.67 - 1.17 mg/dL    GFR Estimate >90 >60 mL/min/1.73m2    Calcium 9.2 8.6 - 10.0 mg/dL    Chloride 94 (L) 98 - 107 mmol/L    Glucose 109 (H) 70 - 99 mg/dL    Alkaline Phosphatase 131 40 - 150 U/L    AST 23 0 - 45 U/L    ALT 20 0 - 70 U/L    Protein Total 7.5 6.4 - 8.3 g/dL    Albumin 4.6 3.5 - 5.2 g/dL    Bilirubin Total 0.4 <=1.2 mg/dL   Troponin T, High Sensitivity   Result Value Ref Range    Troponin T, High Sensitivity 7 <=22 ng/L   [place] Draw    Narrative    The following orders were created for panel order [place] Draw.  Procedure                               Abnormality         Status                     ---------                               -----------         ------                     Extra Blue Top Tube[[id number]]                              Final result               Extra Red Top Tube[[id number]]                               Final result                 Please view results for these tests on the individual orders.   CBC with platelets and differential   Result Value Ref Range    WBC Count 11.8 (H) 4.0 - 11.0 10e3/uL    RBC Count 4.63 4.40 - 5.90 10e6/uL    Hemoglobin 15.3 13.3 - 17.7 g/dL    Hematocrit 42.8 40.0 - 53.0 %    MCV 92 78 - 100 fL    MCH 33.0 26.5 - 33.0 pg    MCHC 35.7 31.5 - 36.5 g/dL    RDW 12.5 10.0 - 15.0 %    Platelet Count 342 150 - 450 10e3/uL    % Neutrophils 68 %    % Lymphocytes 22 %    % Monocytes 7 %    % Eosinophils 3 %    % Basophils 0 %    % Immature Granulocytes 0 %    NRBCs per 100 WBC 0 <1 /100    Absolute Neutrophils 7.8 1.6 - 8.3 10e3/uL    Absolute  Lymphocytes 2.6 0.8 - 5.3 10e3/uL    Absolute Monocytes 0.9 0.0 - 1.3 10e3/uL    Absolute Eosinophils 0.4 0.0 - 0.7 10e3/uL    Absolute Basophils 0.0 0.0 - 0.2 10e3/uL    Absolute Immature Granulocytes 0.0 <=0.4 10e3/uL    Absolute NRBCs 0.0 10e3/uL   Extra Blue Top Tube   Result Value Ref Range    Hold Specimen JIC    Extra Red Top Tube   Result Value Ref Range    Hold Specimen JIC    Chest XR,  PA & LAT    Narrative    EXAM: XR CHEST 2 VIEWS  LOCATION: [facility]  DATE: 11/28/2023    INDICATION: Received chest compressions hours ago after fentanyl overdose.  Chest pain currently  COMPARISON: None.      Impression    IMPRESSION:   No acute abnormality.    No focal pulmonary infiltrate, pleural effusion, or pneumothorax. The cardiac size and mediastinal contours appear within normal limits. No definite rib fracture. Prominent nipple shadows.       MEDICATIONS GIVEN IN THE EMERGENCY DEPARTMENT:  Medications - No data to display        Independent Interpretation (X-rays, CTs, rhythm strip):  Chest x-ray with no fracture, or pneumothorax    Consultations/Discussion of Management or Tests:         Social Determinants of Health affecting care:         Assessments & Plan (with Medical Decision Making)  43 year old male who presents to the Emergency Department for evaluation of chest pain.  Pain present very transiently this evening.  EKG normal.  Patient with labs unremarkable.  Chest x-ray normal.  Given the fact that patient had received Narcan x 3 in the context of fentanyl overdose, and chest compressions for a few minutes, this is likely musculoskeletal in etiology.  Reassurance given.  With negative troponin, and recent negative chest pain workup, patient does admit to significant anxiety at home, and therefore may have anxiety component.  Reassurance given, and patient encouraged to follow-up in clinic, and otherwise be seen if new or worsening symptoms develop       I have reviewed the  nursing notes.    I have reviewed the findings, diagnosis, plan and need for follow up with the patient.       Critical Care time:  none      NEW PRESCRIPTIONS STARTED AT TODAY'S ER VISIT  Discharge Medication List as of 11/29/2023 12:33 AM          Final diagnoses:   Chest pain, unspecified type       11/28/2023   [facility] EMERGENCY DEPT       Sandor Huffman MD  11/29/23 0058

## 2023-11-29 NOTE — ED TRIAGE NOTES
Chest pain, states overdosed on fentanyl this am, was given 3 doses of narcan and woke up to them performing chest compressions, worsening chest pain this evening.     Triage Assessment (Adult)       Row Name 11/28/23 2308          Triage Assessment    Airway WDL WDL        Cardiac WDL    Cardiac WDL X;chest pain        Cognitive/Neuro/Behavioral WDL    Cognitive/Neuro/Behavioral WDL WDL

## 2024-02-19 ENCOUNTER — HOSPITAL ENCOUNTER (EMERGENCY)
Facility: CLINIC | Age: 44
Discharge: LEFT BEFORE DISCHARGE | End: 2024-02-19
Admitting: EMERGENCY MEDICINE
Payer: COMMERCIAL

## 2024-02-19 VITALS
OXYGEN SATURATION: 93 % | BODY MASS INDEX: 22.96 KG/M2 | WEIGHT: 155 LBS | HEART RATE: 83 BPM | RESPIRATION RATE: 18 BRPM | TEMPERATURE: 98.4 F | HEIGHT: 69 IN

## 2024-02-19 PROCEDURE — 99281 EMR DPT VST MAYX REQ PHY/QHP: CPT | Performed by: EMERGENCY MEDICINE

## 2024-02-19 NOTE — ED TRIAGE NOTES
Pt brought to ED via EMS, wife was concerned about pt who had smoked Fentanyl 1 hr prior to EMS arrival. States he fell asleep on the toilet. On arrival, EMS reports pt woke easily with a gentle shake. Pt reports having an anxiety attack on ride in. EMS gave an albuterol neb, which resolved the anxiety. Pt arrives alert and oriented in no distress, states he feels fine now.        Triage Assessment (Adult)       Row Name 02/19/24 6045          Triage Assessment    Airway WDL WDL        Cardiac WDL    Cardiac WDL WDL        Cognitive/Neuro/Behavioral WDL    Cognitive/Neuro/Behavioral WDL WDL

## 2024-02-19 NOTE — ED NOTES
Pt arrives alert and oriented, answering all questions appropriately. Pt states he does not want to be seen any longer. States his wife is coming to pick him up. Pt does not want to wait to see MD. Pt signs LWBS form and ambulates out steady gait. Pt encouraged to return if anything changes or gets worse.

## 2024-03-12 ENCOUNTER — TELEPHONE (OUTPATIENT)
Dept: BEHAVIORAL HEALTH | Facility: CLINIC | Age: 44
End: 2024-03-12
Payer: COMMERCIAL

## 2024-03-12 NOTE — TELEPHONE ENCOUNTER
Returned call to [facility]. [facility] pt will be discharging 3/27 and would like to transition from strips to sublocade. Scheduled for in person with  4/1/24 at 3:30

## 2024-03-26 ENCOUNTER — TELEPHONE (OUTPATIENT)
Dept: BEHAVIORAL HEALTH | Facility: CLINIC | Age: 44
End: 2024-03-26
Payer: COMMERCIAL

## 2024-03-27 NOTE — TELEPHONE ENCOUNTER
Per chart, patient contacted [facility] today re: withdrawal symptoms. Patient scheduled for Recovery Clinic visit 4/1/24. Attempted to call patient to offer walk-in Recovery Clinic hours if wanting to be seen before 4/1/24. No answer. M with walk-in hours and clinic phone number. Not active in [facility] since 2011.    [facility]  [address], [address]   [place], [place], [address]  Phone: [phone]  Fax: [phone]    Open Monday-Friday  Closed over lunch hour  Walk in hours: 9am-11:30am and 12:30-3pm    Hannah Head RN on 3/27/2024 at 4:17 PM

## 2024-03-29 ENCOUNTER — APPOINTMENT (OUTPATIENT)
Dept: CT IMAGING | Facility: CLINIC | Age: 44
End: 2024-03-29
Attending: EMERGENCY MEDICINE
Payer: COMMERCIAL

## 2024-03-29 ENCOUNTER — HOSPITAL ENCOUNTER (EMERGENCY)
Facility: CLINIC | Age: 44
Discharge: HOME | End: 2024-03-29
Attending: EMERGENCY MEDICINE | Admitting: EMERGENCY MEDICINE
Payer: COMMERCIAL

## 2024-03-29 VITALS
OXYGEN SATURATION: 99 % | BODY MASS INDEX: 24.44 KG/M2 | DIASTOLIC BLOOD PRESSURE: 61 MMHG | WEIGHT: 165 LBS | TEMPERATURE: 97.8 F | HEART RATE: 78 BPM | SYSTOLIC BLOOD PRESSURE: 110 MMHG | HEIGHT: 69 IN | RESPIRATION RATE: 12 BRPM

## 2024-03-29 DIAGNOSIS — F19.10 POLYSUBSTANCE ABUSE (H): ICD-10-CM

## 2024-03-29 DIAGNOSIS — F41.9 ANXIETY: ICD-10-CM

## 2024-03-29 DIAGNOSIS — R91.8 GROUND GLASS OPACITY PRESENT ON IMAGING OF LUNG: ICD-10-CM

## 2024-03-29 DIAGNOSIS — G89.29 CHRONIC NECK PAIN: ICD-10-CM

## 2024-03-29 DIAGNOSIS — R07.9 ACUTE CHEST PAIN: ICD-10-CM

## 2024-03-29 DIAGNOSIS — M54.2 CHRONIC NECK PAIN: ICD-10-CM

## 2024-03-29 LAB
ALBUMIN SERPL BCG-MCNC: 3.6 G/DL (ref 3.5–5.2)
ALP SERPL-CCNC: 114 U/L (ref 40–150)
ALT SERPL W P-5'-P-CCNC: 61 U/L (ref 0–70)
ANION GAP SERPL CALCULATED.3IONS-SCNC: 9 MMOL/L (ref 7–15)
AST SERPL W P-5'-P-CCNC: 40 U/L (ref 0–45)
BASOPHILS # BLD AUTO: 0 10E3/UL (ref 0–0.2)
BASOPHILS NFR BLD AUTO: 0 %
BILIRUB SERPL-MCNC: 0.7 MG/DL
BUN SERPL-MCNC: 8.7 MG/DL (ref 6–20)
CALCIUM SERPL-MCNC: 8.1 MG/DL (ref 8.6–10)
CHLORIDE SERPL-SCNC: 99 MMOL/L (ref 98–107)
CREAT SERPL-MCNC: 0.57 MG/DL (ref 0.67–1.17)
D DIMER PPP FEU-MCNC: 0.83 UG/ML FEU (ref 0–0.5)
DEPRECATED HCO3 PLAS-SCNC: 25 MMOL/L (ref 22–29)
EGFRCR SERPLBLD CKD-EPI 2021: >90 ML/MIN/1.73M2
EOSINOPHIL # BLD AUTO: 0.3 10E3/UL (ref 0–0.7)
EOSINOPHIL NFR BLD AUTO: 2 %
ERYTHROCYTE [DISTWIDTH] IN BLOOD BY AUTOMATED COUNT: 12.8 % (ref 10–15)
GLUCOSE SERPL-MCNC: 120 MG/DL (ref 70–99)
HCT VFR BLD AUTO: 36.9 % (ref 40–53)
HGB BLD-MCNC: 12.8 G/DL (ref 13.3–17.7)
HOLD SPECIMEN: NORMAL
IMM GRANULOCYTES # BLD: 0 10E3/UL
IMM GRANULOCYTES NFR BLD: 0 %
LYMPHOCYTES # BLD AUTO: 2.5 10E3/UL (ref 0.8–5.3)
LYMPHOCYTES NFR BLD AUTO: 24 %
MCH RBC QN AUTO: 32.7 PG (ref 26.5–33)
MCHC RBC AUTO-ENTMCNC: 34.7 G/DL (ref 31.5–36.5)
MCV RBC AUTO: 94 FL (ref 78–100)
MONOCYTES # BLD AUTO: 1.4 10E3/UL (ref 0–1.3)
MONOCYTES NFR BLD AUTO: 13 %
NEUTROPHILS # BLD AUTO: 6.4 10E3/UL (ref 1.6–8.3)
NEUTROPHILS NFR BLD AUTO: 60 %
NRBC # BLD AUTO: 0 10E3/UL
NRBC BLD AUTO-RTO: 0 /100
PLATELET # BLD AUTO: 250 10E3/UL (ref 150–450)
POTASSIUM SERPL-SCNC: 3.8 MMOL/L (ref 3.4–5.3)
PROT SERPL-MCNC: 6.1 G/DL (ref 6.4–8.3)
RBC # BLD AUTO: 3.91 10E6/UL (ref 4.4–5.9)
SODIUM SERPL-SCNC: 133 MMOL/L (ref 135–145)
TROPONIN T SERPL HS-MCNC: 8 NG/L
WBC # BLD AUTO: 10.7 10E3/UL (ref 4–11)

## 2024-03-29 PROCEDURE — 99285 EMERGENCY DEPT VISIT HI MDM: CPT | Mod: 25 | Performed by: EMERGENCY MEDICINE

## 2024-03-29 PROCEDURE — 84484 ASSAY OF TROPONIN QUANT: CPT | Performed by: EMERGENCY MEDICINE

## 2024-03-29 PROCEDURE — 93010 ELECTROCARDIOGRAM REPORT: CPT | Performed by: EMERGENCY MEDICINE

## 2024-03-29 PROCEDURE — 85025 COMPLETE CBC W/AUTO DIFF WBC: CPT | Performed by: EMERGENCY MEDICINE

## 2024-03-29 PROCEDURE — 99285 EMERGENCY DEPT VISIT HI MDM: CPT | Performed by: EMERGENCY MEDICINE

## 2024-03-29 PROCEDURE — 250N000011 HC RX IP 250 OP 636: Performed by: EMERGENCY MEDICINE

## 2024-03-29 PROCEDURE — 85379 FIBRIN DEGRADATION QUANT: CPT | Performed by: EMERGENCY MEDICINE

## 2024-03-29 PROCEDURE — 71275 CT ANGIOGRAPHY CHEST: CPT

## 2024-03-29 PROCEDURE — 80053 COMPREHEN METABOLIC PANEL: CPT | Performed by: EMERGENCY MEDICINE

## 2024-03-29 PROCEDURE — 250N000013 HC RX MED GY IP 250 OP 250 PS 637: Performed by: EMERGENCY MEDICINE

## 2024-03-29 PROCEDURE — 36415 COLL VENOUS BLD VENIPUNCTURE: CPT | Performed by: EMERGENCY MEDICINE

## 2024-03-29 PROCEDURE — 93005 ELECTROCARDIOGRAM TRACING: CPT | Performed by: EMERGENCY MEDICINE

## 2024-03-29 PROCEDURE — 250N000009 HC RX 250: Performed by: EMERGENCY MEDICINE

## 2024-03-29 PROCEDURE — 96374 THER/PROPH/DIAG INJ IV PUSH: CPT | Mod: 59 | Performed by: EMERGENCY MEDICINE

## 2024-03-29 PROCEDURE — 96375 TX/PRO/DX INJ NEW DRUG ADDON: CPT | Mod: 59 | Performed by: EMERGENCY MEDICINE

## 2024-03-29 RX ORDER — IOPAMIDOL 755 MG/ML
73 INJECTION, SOLUTION INTRAVASCULAR ONCE
Status: COMPLETED | OUTPATIENT
Start: 2024-03-29 | End: 2024-03-29

## 2024-03-29 RX ORDER — KETOROLAC TROMETHAMINE 30 MG/ML
30 INJECTION, SOLUTION INTRAMUSCULAR; INTRAVENOUS ONCE
Status: COMPLETED | OUTPATIENT
Start: 2024-03-29 | End: 2024-03-29

## 2024-03-29 RX ORDER — LORAZEPAM 2 MG/ML
1 INJECTION INTRAMUSCULAR ONCE
Status: COMPLETED | OUTPATIENT
Start: 2024-03-29 | End: 2024-03-29

## 2024-03-29 RX ORDER — DROPERIDOL 2.5 MG/ML
2.5 INJECTION, SOLUTION INTRAMUSCULAR; INTRAVENOUS ONCE
Status: COMPLETED | OUTPATIENT
Start: 2024-03-29 | End: 2024-03-29

## 2024-03-29 RX ORDER — ACETAMINOPHEN 500 MG
1000 TABLET ORAL ONCE
Status: COMPLETED | OUTPATIENT
Start: 2024-03-29 | End: 2024-03-29

## 2024-03-29 RX ADMIN — SODIUM CHLORIDE 200 ML: 9 INJECTION, SOLUTION INTRAVENOUS at 11:06

## 2024-03-29 RX ADMIN — LORAZEPAM 1 MG: 2 INJECTION INTRAMUSCULAR; INTRAVENOUS at 10:49

## 2024-03-29 RX ADMIN — IOPAMIDOL 146 ML: 755 INJECTION, SOLUTION INTRAVENOUS at 11:06

## 2024-03-29 RX ADMIN — ACETAMINOPHEN 1000 MG: 500 TABLET, FILM COATED ORAL at 10:52

## 2024-03-29 RX ADMIN — KETOROLAC TROMETHAMINE 30 MG: 30 INJECTION, SOLUTION INTRAMUSCULAR at 10:48

## 2024-03-29 RX ADMIN — DROPERIDOL 2.5 MG: 2.5 INJECTION, SOLUTION INTRAMUSCULAR; INTRAVENOUS at 10:52

## 2024-03-29 ASSESSMENT — COLUMBIA-SUICIDE SEVERITY RATING SCALE - C-SSRS
1. IN THE PAST MONTH, HAVE YOU WISHED YOU WERE DEAD OR WISHED YOU COULD GO TO SLEEP AND NOT WAKE UP?: NO
2. HAVE YOU ACTUALLY HAD ANY THOUGHTS OF KILLING YOURSELF IN THE PAST MONTH?: NO
6. HAVE YOU EVER DONE ANYTHING, STARTED TO DO ANYTHING, OR PREPARED TO DO ANYTHING TO END YOUR LIFE?: NO

## 2024-03-29 ASSESSMENT — ACTIVITIES OF DAILY LIVING (ADL)
ADLS_ACUITY_SCORE: 35

## 2024-03-29 NOTE — ED PROVIDER NOTES
History     Chief Complaint   Patient presents with    Chest Pain     HPI  History per patient, his wife and review of [facility] EMR and [facility] Every[facility] EMR, including extensive chart review of multiple prior imaging studies (most recent chest x-ray 11/20/2023, CT chest/abdomen/pelvis with IV contrast 5/27/2022 and 4/20/2022), multiple clinic notes and recent telephone encounters and multiple prior laboratory evaluations (baseline CBC comprehensive metabolic panel and troponin from 11/28/2023.    Kayy Vieyra is a 44 year old male with history of smoking, medical cannabis use, chronic neck pain, complex regional pain syndrome type II of the right lower extremity, chronic pain syndrome, maintenance on buprenorphine/naloxone and depression who presents emergency department with several days of atraumatic right-sided chest pain and neck pain which began the day following his discharge from a CD treatment program for treatment of fentanyl/opiate dependence and abuse. 4 days ago he was discharged from the [facility] treatment center and was started on Suboxone he was also rx Hydroxyzine, Lexapro, Prazosin.  He is wife are concerned he may be having a side effect about his newly prescribed medications.  He is had no acute URI signs or symptoms, hemoptysis or leg pain or leg swelling.  No fever or chills.  He has has no back or flank pain.  No other acute complaints or concerns.      Previous Records Reviewed:  Telephone Encounter - Anastasia Mccarty - 03/28/2024 2:20 PM CDT  Formatting of this note might be different from the original.  Patient no-showed appointment. Closing encounter.    Anastasia Mccarty .................... 3/28/2024 2:20 PM    Electronically signed by Anastasia Mccarty at 03/28/2024 2:20 PM CDT   Back to top of Miscellaneous Notes  Telephone Encounter - Elizabeth Scott RN - 03/27/2024 1:55 PM CDT  Formatting of this note might be different from the original.  Called and spoke to nurse at [facility]  [place] and requested record for patient's recent stay. Nurse will check on policy to release record. Elizabeth Scott RN .................... 3/27/2024 1:56 PM    Electronically signed by Elizabeth Scott RN at 03/27/2024 1:57 PM CDT   Back to top of Miscellaneous Notes  Telephone Encounter - Elizabeth Scott RN - 03/27/2024 1:49 PM CDT  Formatting of this note might be different from the original.  Spoke with wife Destini and she did get message to call treatment center. But she feels the symptoms patient is having are new and may be related to the new meds he was discharged on from the chemical dependency treatment [place] on 3/26. Will attempt to contact [facility] and request records. Elizabeth Scott RN .................... 3/27/2024 1:50 PM    Electronically signed by Elizabeth Scott RN at 03/27/2024 1:57 PM CDT   Back to top of Miscellaneous Notes  Telephone Encounter - Elizabeth Scott RN - 03/27/2024 9:58 AM CDT  Formatting of this note might be different from the original.  Provider reviewed information and feeling symptoms may be related to ongoing withdrawal symptoms. Request patient contact the [facility] to follow up on symptoms and then call clinic back with update. Left message on Spouses phone. Elizabeth Scott RN .................... 3/27/2024 9:59 AM    Electronically signed by Elizabeth Scott RN at 03/27/2024 11:14 AM CDT   Back to top of Miscellaneous Notes  Telephone Encounter - Elizabeth Scott RN - 03/27/2024 8:53 AM CDT  Formatting of this note might be different from the original.  Situation: Patient having shakes all day yesterday. Discharged from treatment facility yesterday. From [facility]. Phone: [phone].    Background: History of polysubstance abuse disorder. Recently stopped Fentanyl. Last date of use 3/27/2024.    Assessment: When laying down to sleep he jerks frequently. Will wake with a jolt feeling as he had a shock. Very jittery and shaking all day yesterday.  Will update med list with new meds ordered from treatment: Hydroxyzine 50 mg tab, 1-2 tab every 6 hours as needed for withdrawal symptoms, Prazosin HCL 1 mg tab at HS, Escitalopram 20 mg daily. Spoke to spouse other states patient has lab orders from [facility].    Allergies:  No Known Allergies    Problem List:    Patient Active Problem List    Diagnosis Date Noted    Recurrent major depression (H24) 11/06/2023     Priority: Medium    Osteoarthritis of fingers of both hands 07/01/2021     Priority: Medium    Bilateral carpal tunnel syndrome 03/27/2020     Priority: Medium    Chronic pain disorder 03/01/2019     Priority: Medium    Insomnia due to medical condition 03/01/2019     Priority: Medium    Restless legs 03/01/2019     Priority: Medium    Complex regional pain syndrome type 2 of right lower extremity 07/18/2018     Priority: Medium    Medical cannabis use 07/18/2018     Priority: Medium     Formatting of this note might be different from the original. Certified today      Chronic neck pain 07/01/2014     Priority: Medium    Vertebral compression fracture (H) 07/01/2014     Priority: Medium     Formatting of this note might be different from the original. Thoracic      Tobacco abuse: 1 ppd since 1993 10/03/2011     Priority: Medium    S/P appendectomy: July 15, 2011 10/03/2011     Priority: Medium    Lipoma of skin: on the back and arm 10/03/2011     Priority: Medium    Hand pain and numbness, bilateral 10/03/2011     Priority: Medium    CTS (carpal tunnel syndrome) 10/03/2011     Priority: Medium        Past Medical History:    No past medical history on file.    Past Surgical History:    Past Surgical History:   Procedure Laterality Date    APPENDECTOMY OPEN  July 2011    lipoma removal         Family History:    Family History   Problem Relation Age of Onset    Arthritis Mother         RA    Unknown/Adopted Maternal Grandmother     Unknown/Adopted Maternal Grandfather     Unknown/Adopted  "Paternal Grandmother     Unknown/Adopted Paternal Grandfather     Asthma No family hx of     C.A.D. No family hx of     Diabetes No family hx of     Hypertension No family hx of     Cerebrovascular Disease No family hx of     Breast Cancer No family hx of     Cancer - colorectal No family hx of     Prostate Cancer No family hx of     Alzheimer Disease No family hx of     Blood Disease No family hx of     Cancer No family hx of     Cardiovascular No family hx of     Circulatory No family hx of     Eye Disorder No family hx of     Gastrointestinal Disease No family hx of     Genitourinary Problems No family hx of     Heart Disease No family hx of     Lipids No family hx of     Musculoskeletal Disorder No family hx of     Neurologic Disorder No family hx of     Respiratory No family hx of     Thyroid Disease No family hx of        Social History:  Marital Status:  Single [1]  Social History     Tobacco Use    Smoking status: Every Day     Packs/day: 1     Types: Cigarettes    Smokeless tobacco: Never    Tobacco comments:     1 pack per day   Substance Use Topics    Alcohol use: No    Drug use: No        Medications:    amoxicillin-clavulanate (AUGMENTIN) 875-125 MG tablet  hydrocodone-acetaminophen 5-325 MG per tablet  Naproxen Sodium (ALEVE PO)      Review of Systems  As mentioned in the HPI, in addition focused review of systems was negative.    Physical Exam   BP: 139/73  Pulse: 95  Temp: 97.8  F (36.6  C)  Resp: 20  Height: 175.3 cm (5' 9\")  Weight: 74.8 kg (165 lb)  SpO2: 98 %      Physical Exam  Vitals and nursing note reviewed.   Constitutional:       General: He is in acute distress.      Appearance: Normal appearance. He is well-developed. He is not ill-appearing, toxic-appearing or diaphoretic.   HENT:      Head: Normocephalic and atraumatic.      Right Ear: External ear normal.      Left Ear: External ear normal.      Nose: Nose normal.      Mouth/Throat:      Mouth: Mucous membranes are moist.      Pharynx: " Oropharynx is clear.   Eyes:      General: No scleral icterus.     Extraocular Movements: Extraocular movements intact.      Conjunctiva/sclera: Conjunctivae normal.   Neck:      Trachea: No tracheal deviation.   Cardiovascular:      Rate and Rhythm: Normal rate and regular rhythm.      Pulses: Normal pulses.      Heart sounds: Normal heart sounds. No murmur heard.     No friction rub. No gallop.   Pulmonary:      Effort: Pulmonary effort is normal. No respiratory distress.      Breath sounds: Normal breath sounds. No stridor. No wheezing, rhonchi or rales.   Chest:      Chest wall: No tenderness.   Abdominal:      General: There is no distension.      Palpations: Abdomen is soft.      Tenderness: There is no abdominal tenderness. There is no right CVA tenderness or left CVA tenderness.   Musculoskeletal:         General: Tenderness present. No swelling. Normal range of motion.      Cervical back: Normal range of motion and neck supple.      Right lower leg: No edema.      Left lower leg: No edema.   Skin:     General: Skin is warm and dry.      Coloration: Skin is not pale.      Findings: No erythema or rash.   Neurological:      General: No focal deficit present.      Mental Status: He is alert and oriented to person, place, and time.   Psychiatric:         Behavior: Behavior normal.      Comments: Very anxious and restless.         ED Course        Procedures              EKG Interpretation:      Interpreted by Augusto Hugo MD  Time reviewed: Upon completion  Symptoms at time of EKG: Right-sided chest pain and posterior neck pain  Rhythm: normal sinus   Rate: normal  Axis: normal  Ectopy: none  Conduction: normal  ST Segments/ T Waves: No ST-T wave changes  Q Waves: none  Comparison to prior: Unchanged from 11/28/2023  Clinical Impression: normal EKG         Results for orders placed or performed during the hospital encounter of 03/29/24   CT Chest Pulmonary Embolism w Contrast     Status: None    Narrative     CT CHEST PULMONARY EMBOLISM W CONTRAST 3/29/2024 11:18 AM    CLINICAL HISTORY: right sided chest pain and mildly elevated d-dimer  TECHNIQUE: CT angiogram chest during arterial phase injection IV  contrast. 2D and 3D MIP reconstructions were performed by the CT  technologist. Dose reduction techniques were used.     CONTRAST: 200 mL Isovue 370    COMPARISON: Chest x-ray: 11/28/2023    FINDINGS:  ANGIOGRAM CHEST: Pulmonary arteries are normal caliber and negative  for pulmonary emboli. Thoracic aorta is negative for dissection. No CT  evidence of right heart strain.    LUNGS AND PLEURA: Evaluation is slightly limited by breathing motion.  No pleural effusion. Small focus of groundglass opacity posteriorly in  the left lower lobe at the lung bases may be inflammatory or due to  atelectasis (series 7, image 145). Small groundglass opacity in the  lingula measuring 5 mm (7/157). No pulmonary mass. No bronchiectasis  or pulmonary fibrosis.    MEDIASTINUM/AXILLAE: Few small mediastinal and hilar lymph nodes. For  example, there is a 14 mm precarinal lymph node (6/35) and a 10 mm  left hilar lymph node (6/39). No thoracic aortic aneurysm. No coronary  artery calcifications. No pericardial effusion.    UPPER ABDOMEN: Hepatic steatosis.    MUSCULOSKELETAL: No concerning bone lesions.      Impression    IMPRESSION:  1.  No pulmonary emboli.  2.  Small focus of groundglass opacity posteriorly in the left lower  lobe may be infectious/inflammatory or due to atelectasis. Consider  follow-up chest CT in 3 months.  3.  A 5 mm groundglass nodule in the lingula. Attention on follow-up.  4.  Mild paraseptal emphysema.  5.  Mild mediastinal and hilar lymphadenopathy is nonspecific, may be  reactive.  6.  Hepatic steatosis.    SAMANTHA HENDERSON MD         SYSTEM ID:  [id number]   [place] Draw *Canceled*     Status: None ()    Narrative    The following orders were created for panel order [place] Draw.  Procedure                                Abnormality         Status                     ---------                               -----------         ------                       Please view results for these tests on the individual orders.   Comprehensive metabolic panel     Status: Abnormal   Result Value Ref Range    Sodium 133 (L) 135 - 145 mmol/L    Potassium 3.8 3.4 - 5.3 mmol/L    Carbon Dioxide (CO2) 25 22 - 29 mmol/L    Anion Gap 9 7 - 15 mmol/L    Urea Nitrogen 8.7 6.0 - 20.0 mg/dL    Creatinine 0.57 (L) 0.67 - 1.17 mg/dL    GFR Estimate >90 >60 mL/min/1.73m2    Calcium 8.1 (L) 8.6 - 10.0 mg/dL    Chloride 99 98 - 107 mmol/L    Glucose 120 (H) 70 - 99 mg/dL    Alkaline Phosphatase 114 40 - 150 U/L    AST 40 0 - 45 U/L    ALT 61 0 - 70 U/L    Protein Total 6.1 (L) 6.4 - 8.3 g/dL    Albumin 3.6 3.5 - 5.2 g/dL    Bilirubin Total 0.7 <=1.2 mg/dL   Troponin T, High Sensitivity     Status: Normal   Result Value Ref Range    Troponin T, High Sensitivity 8 <=22 ng/L   [place] Draw     Status: None    Narrative    The following orders were created for panel order [place] Draw.  Procedure                               Abnormality         Status                     ---------                               -----------         ------                     Extra Blue Top Tube[[id number]]                              Final result               Extra Red Top Tube[[id number]]                                                            Please view results for these tests on the individual orders.   CBC with platelets and differential     Status: Abnormal   Result Value Ref Range    WBC Count 10.7 4.0 - 11.0 10e3/uL    RBC Count 3.91 (L) 4.40 - 5.90 10e6/uL    Hemoglobin 12.8 (L) 13.3 - 17.7 g/dL    Hematocrit 36.9 (L) 40.0 - 53.0 %    MCV 94 78 - 100 fL    MCH 32.7 26.5 - 33.0 pg    MCHC 34.7 31.5 - 36.5 g/dL    RDW 12.8 10.0 - 15.0 %    Platelet Count 250 150 - 450 10e3/uL    % Neutrophils 60 %    % Lymphocytes 24 %    % Monocytes 13 %    % Eosinophils 2 %    %  Basophils 0 %    % Immature Granulocytes 0 %    NRBCs per 100 WBC 0 <1 /100    Absolute Neutrophils 6.4 1.6 - 8.3 10e3/uL    Absolute Lymphocytes 2.5 0.8 - 5.3 10e3/uL    Absolute Monocytes 1.4 (H) 0.0 - 1.3 10e3/uL    Absolute Eosinophils 0.3 0.0 - 0.7 10e3/uL    Absolute Basophils 0.0 0.0 - 0.2 10e3/uL    Absolute Immature Granulocytes 0.0 <=0.4 10e3/uL    Absolute NRBCs 0.0 10e3/uL   Extra Blue Top Tube     Status: None   Result Value Ref Range    Hold Specimen JIC    D dimer quantitative     Status: Abnormal   Result Value Ref Range    D-Dimer Quantitative 0.83 (H) 0.00 - 0.50 ug/mL FEU    Narrative    This D-dimer assay is intended for use in conjunction with a clinical pretest probability assessment model to exclude pulmonary embolism (PE) and deep venous thrombosis (DVT) in outpatients suspected of PE or DVT. The cut-off value is 0.50 ug/mL FEU.   Extra Tube     Status: None    Narrative    The following orders were created for panel order Extra Tube.  Procedure                               Abnormality         Status                     ---------                               -----------         ------                     Extra Red Top Tube[[id number]]                               Final result               Extra Purple Top Tube[[id number]]                            Final result                 Please view results for these tests on the individual orders.   Extra Red Top Tube     Status: None   Result Value Ref Range    Hold Specimen JIC    Extra Purple Top Tube     Status: None   Result Value Ref Range    Hold Specimen JIC    CBC with platelets, differential     Status: Abnormal    Narrative    The following orders were created for panel order CBC with platelets, differential.  Procedure                               Abnormality         Status                     ---------                               -----------         ------                     CBC with platelets and d...[[id number]]  Abnormal             Final result                 Please view results for these tests on the individual orders.         Medications   ketorolac (TORADOL) injection 30 mg (30 mg Intravenous $Given 3/29/24 1048)   LORazepam (ATIVAN) injection 1 mg (1 mg Intravenous $Given 3/29/24 1049)   acetaminophen (TYLENOL) tablet 1,000 mg (1,000 mg Oral $Given 3/29/24 1052)   droPERidol (INAPSINE) injection 2.5 mg (2.5 mg Intravenous $Given 3/29/24 1052)   iopamidol (ISOVUE-370) solution 73 mL (146 mLs Intravenous $Given 3/29/24 1106)   sodium chloride 0.9 % bag 500mL for CT scan flush use (200 mLs Intravenous $Given 3/29/24 1106)     11:46 AM -much improved after meds.    1:22 PM - he fell asleep, rest comfortably he is now stable for discharge home with normal vital signs, normal O2 saturation of 100% on room air.  His wife reports that she reviewed his cell phone records and he relapsed and used methamphetamine since his discharge from the CD treatment program 3 days ago.      Assessments & Plan (with Medical Decision Making)   44 year old male with history of polysubstance abuse and smoking who presents emergency department with several days of atraumatic right-sided chest pain and neck pain which began the day following his discharge from a CD treatment program for treatment of fentanyl/opiate dependence and abuse.   He was quite anxious and distressed but had no respiratory distress or hypoxia.  Extensive evaluation was performed and all unremarkable for CT chest PE protocol study which showed no pulmonary emboli, a small focus of groundglass opacity posteriorly in the left lower lobe which may be infectious/inflammatory or due to atelectasis, a 5 mm groundglass nodule in the lingula and mild ild mediastinal and hilar lymphadenopathy is nonspecific, may be reactive. He has no URI signs or symptoms and I will treat him prophylactically for an infectious process/pneumonia with Augmentin.  I recommend primary care clinic follow-up next week and  made a primary care referral to help facilitate this.  He and his wife were counseled on CT findings and recommendation for repeat/follow-up CT imaging to reevaluate his CT findings today.  While he was sleeping after he was given medications, including Inapsine and Ativan IV for significant anxiety on presentation, his wife looked through his cell phone messages and contacts and ascertained that he has been abusing methamphetamine since his discharge from the  treatment program 4 days ago.  Chest pain is probably of benign pleuritic  nature, possibly secondary costochondritis or chest wall strain, and exacerbated by severe anxiety. I doubt ACS/atypical ACS, aneurysm/dissection, pericarditis, PE/DVT, PTX, pneumonia or emergent GI or hepatobiliary disease process as a cause of the pain.  He and his wife were provided instructions for supportive care and will return as needed for worsened condition or worsening symptoms, or new problems or concerns.      I have reviewed the nursing notes.    I have reviewed the findings, diagnosis, plan and need for follow up with the patient and his wife.    Medical Decision Making: High complexity  Multiple life-threatening diagnoses were considered and evaluated for.      Discharge Medication List as of 3/29/2024  1:29 PM        START taking these medications    Details   amoxicillin-clavulanate (AUGMENTIN) 875-125 MG tablet Take 1 tablet by mouth 2 times daily for 7 days, Disp-14 tablet, R-0, E-Prescribe             Final diagnoses:   Acute chest pain   Ground glass opacity present on imaging of lung - Possible pneumonia   Chronic neck pain   Polysubstance abuse (H)   Anxiety       3/29/2024   [facility] EMERGENCY DEPT       Augusto Hugo MD  03/30/24 2974       Augusto Hugo MD  03/30/24 1233

## 2024-03-29 NOTE — ED TRIAGE NOTES
Right chest pain and neck pain started a few days ago     Triage Assessment (Adult)       Row Name 03/29/24 0921          Respiratory WDL    Respiratory WDL X        Cardiac WDL    Cardiac WDL X

## 2024-04-01 ENCOUNTER — APPOINTMENT (OUTPATIENT)
Dept: LAB | Facility: CLINIC | Age: 44
End: 2024-04-01
Payer: COMMERCIAL

## 2024-04-05 ENCOUNTER — HOSPITAL ENCOUNTER (EMERGENCY)
Facility: CLINIC | Age: 44
Discharge: LEFT BEFORE DISCHARGE | End: 2024-04-05
Admitting: FAMILY MEDICINE
Payer: COMMERCIAL

## 2024-04-05 VITALS
WEIGHT: 165 LBS | TEMPERATURE: 97.7 F | DIASTOLIC BLOOD PRESSURE: 107 MMHG | SYSTOLIC BLOOD PRESSURE: 135 MMHG | BODY MASS INDEX: 24.37 KG/M2 | OXYGEN SATURATION: 98 % | HEART RATE: 103 BPM | RESPIRATION RATE: 22 BRPM

## 2024-04-05 PROCEDURE — 99281 EMR DPT VST MAYX REQ PHY/QHP: CPT

## 2024-04-05 ASSESSMENT — COLUMBIA-SUICIDE SEVERITY RATING SCALE - C-SSRS
6. HAVE YOU EVER DONE ANYTHING, STARTED TO DO ANYTHING, OR PREPARED TO DO ANYTHING TO END YOUR LIFE?: NO
1. IN THE PAST MONTH, HAVE YOU WISHED YOU WERE DEAD OR WISHED YOU COULD GO TO SLEEP AND NOT WAKE UP?: NO
2. HAVE YOU ACTUALLY HAD ANY THOUGHTS OF KILLING YOURSELF IN THE PAST MONTH?: NO

## 2024-04-05 NOTE — ED TRIAGE NOTES
Sob and chest x 10 days seen here last week Friday for the same thing and s/s have worsened since discharge from the ED.      Triage Assessment (Adult)       Row Name 04/05/24 0944          Triage Assessment    Airway WDL X  shortness of breath        Respiratory WDL    Respiratory WDL WDL        Skin Circulation/Temperature WDL    Skin Circulation/Temperature WDL WDL        Cardiac WDL    Cardiac WDL X  elevated hr and chest pain        Peripheral/Neurovascular WDL    Peripheral Neurovascular WDL WDL        Cognitive/Neuro/Behavioral WDL    Cognitive/Neuro/Behavioral WDL WDL

## 2024-04-17 ENCOUNTER — APPOINTMENT (OUTPATIENT)
Dept: CT IMAGING | Facility: CLINIC | Age: 44
End: 2024-04-17
Attending: EMERGENCY MEDICINE
Payer: COMMERCIAL

## 2024-04-17 ENCOUNTER — HOSPITAL ENCOUNTER (EMERGENCY)
Facility: CLINIC | Age: 44
Discharge: HOME | End: 2024-04-17
Attending: EMERGENCY MEDICINE | Admitting: EMERGENCY MEDICINE
Payer: COMMERCIAL

## 2024-04-17 VITALS
TEMPERATURE: 96.4 F | HEIGHT: 69 IN | BODY MASS INDEX: 22.22 KG/M2 | RESPIRATION RATE: 27 BRPM | DIASTOLIC BLOOD PRESSURE: 93 MMHG | HEART RATE: 89 BPM | SYSTOLIC BLOOD PRESSURE: 134 MMHG | OXYGEN SATURATION: 98 % | WEIGHT: 150 LBS

## 2024-04-17 DIAGNOSIS — R51.9 ACUTE NONINTRACTABLE HEADACHE, UNSPECIFIED HEADACHE TYPE: ICD-10-CM

## 2024-04-17 DIAGNOSIS — G89.4 CHRONIC PAIN DISORDER: ICD-10-CM

## 2024-04-17 DIAGNOSIS — G43.811 OTHER MIGRAINE WITH STATUS MIGRAINOSUS, INTRACTABLE: ICD-10-CM

## 2024-04-17 LAB
ANION GAP SERPL CALCULATED.3IONS-SCNC: 8 MMOL/L (ref 7–15)
APTT PPP: 29 SECONDS (ref 22–38)
BASOPHILS # BLD AUTO: 0 10E3/UL (ref 0–0.2)
BASOPHILS NFR BLD AUTO: 0 %
BUN SERPL-MCNC: 7.4 MG/DL (ref 6–20)
CALCIUM SERPL-MCNC: 8.6 MG/DL (ref 8.6–10)
CHLORIDE SERPL-SCNC: 100 MMOL/L (ref 98–107)
CREAT SERPL-MCNC: 0.64 MG/DL (ref 0.67–1.17)
DEPRECATED HCO3 PLAS-SCNC: 30 MMOL/L (ref 22–29)
EGFRCR SERPLBLD CKD-EPI 2021: >90 ML/MIN/1.73M2
EOSINOPHIL # BLD AUTO: 0.4 10E3/UL (ref 0–0.7)
EOSINOPHIL NFR BLD AUTO: 3 %
ERYTHROCYTE [DISTWIDTH] IN BLOOD BY AUTOMATED COUNT: 12.6 % (ref 10–15)
GLUCOSE BLDC GLUCOMTR-MCNC: 157 MG/DL (ref 70–99)
GLUCOSE SERPL-MCNC: 153 MG/DL (ref 70–99)
HCT VFR BLD AUTO: 42.4 % (ref 40–53)
HGB BLD-MCNC: 14.7 G/DL (ref 13.3–17.7)
IMM GRANULOCYTES # BLD: 0.1 10E3/UL
IMM GRANULOCYTES NFR BLD: 0 %
INR PPP: 0.95 (ref 0.85–1.15)
LYMPHOCYTES # BLD AUTO: 1.4 10E3/UL (ref 0.8–5.3)
LYMPHOCYTES NFR BLD AUTO: 12 %
MCH RBC QN AUTO: 32.2 PG (ref 26.5–33)
MCHC RBC AUTO-ENTMCNC: 34.7 G/DL (ref 31.5–36.5)
MCV RBC AUTO: 93 FL (ref 78–100)
MONOCYTES # BLD AUTO: 0 10E3/UL (ref 0–1.3)
MONOCYTES NFR BLD AUTO: 0 %
NEUTROPHILS # BLD AUTO: 10.2 10E3/UL (ref 1.6–8.3)
NEUTROPHILS NFR BLD AUTO: 84 %
NRBC # BLD AUTO: 0 10E3/UL
NRBC BLD AUTO-RTO: 0 /100
PLATELET # BLD AUTO: 443 10E3/UL (ref 150–450)
POTASSIUM SERPL-SCNC: 3.5 MMOL/L (ref 3.4–5.3)
RBC # BLD AUTO: 4.57 10E6/UL (ref 4.4–5.9)
SODIUM SERPL-SCNC: 138 MMOL/L (ref 135–145)
TROPONIN T SERPL HS-MCNC: 7 NG/L
WBC # BLD AUTO: 12.2 10E3/UL (ref 4–11)

## 2024-04-17 PROCEDURE — 250N000009 HC RX 250: Performed by: EMERGENCY MEDICINE

## 2024-04-17 PROCEDURE — 93010 ELECTROCARDIOGRAM REPORT: CPT | Performed by: EMERGENCY MEDICINE

## 2024-04-17 PROCEDURE — 250N000011 HC RX IP 250 OP 636: Performed by: EMERGENCY MEDICINE

## 2024-04-17 PROCEDURE — 93005 ELECTROCARDIOGRAM TRACING: CPT | Performed by: EMERGENCY MEDICINE

## 2024-04-17 PROCEDURE — 85610 PROTHROMBIN TIME: CPT | Performed by: EMERGENCY MEDICINE

## 2024-04-17 PROCEDURE — 84484 ASSAY OF TROPONIN QUANT: CPT | Performed by: EMERGENCY MEDICINE

## 2024-04-17 PROCEDURE — 96374 THER/PROPH/DIAG INJ IV PUSH: CPT | Mod: 59 | Performed by: EMERGENCY MEDICINE

## 2024-04-17 PROCEDURE — 85025 COMPLETE CBC W/AUTO DIFF WBC: CPT | Performed by: EMERGENCY MEDICINE

## 2024-04-17 PROCEDURE — 70450 CT HEAD/BRAIN W/O DYE: CPT | Mod: XU

## 2024-04-17 PROCEDURE — 82962 GLUCOSE BLOOD TEST: CPT

## 2024-04-17 PROCEDURE — 99207 PR NO CHARGE LOS: CPT | Performed by: PHYSICIAN ASSISTANT

## 2024-04-17 PROCEDURE — 99285 EMERGENCY DEPT VISIT HI MDM: CPT | Mod: 25 | Performed by: EMERGENCY MEDICINE

## 2024-04-17 PROCEDURE — 70496 CT ANGIOGRAPHY HEAD: CPT

## 2024-04-17 PROCEDURE — 99291 CRITICAL CARE FIRST HOUR: CPT | Mod: 25 | Performed by: EMERGENCY MEDICINE

## 2024-04-17 PROCEDURE — 96375 TX/PRO/DX INJ NEW DRUG ADDON: CPT | Mod: 59 | Performed by: EMERGENCY MEDICINE

## 2024-04-17 PROCEDURE — 36415 COLL VENOUS BLD VENIPUNCTURE: CPT | Performed by: EMERGENCY MEDICINE

## 2024-04-17 PROCEDURE — 85730 THROMBOPLASTIN TIME PARTIAL: CPT | Performed by: EMERGENCY MEDICINE

## 2024-04-17 PROCEDURE — 80048 BASIC METABOLIC PNL TOTAL CA: CPT | Performed by: EMERGENCY MEDICINE

## 2024-04-17 RX ORDER — IOPAMIDOL 755 MG/ML
67 INJECTION, SOLUTION INTRAVASCULAR ONCE
Status: DISCONTINUED | OUTPATIENT
Start: 2024-04-17 | End: 2024-04-17

## 2024-04-17 RX ORDER — KETOROLAC TROMETHAMINE 15 MG/ML
15 INJECTION, SOLUTION INTRAMUSCULAR; INTRAVENOUS ONCE
Status: COMPLETED | OUTPATIENT
Start: 2024-04-17 | End: 2024-04-17

## 2024-04-17 RX ORDER — IOPAMIDOL 755 MG/ML
67 INJECTION, SOLUTION INTRAVASCULAR ONCE
Status: COMPLETED | OUTPATIENT
Start: 2024-04-17 | End: 2024-04-17

## 2024-04-17 RX ORDER — DIPHENHYDRAMINE HYDROCHLORIDE 50 MG/ML
25 INJECTION INTRAMUSCULAR; INTRAVENOUS ONCE
Status: COMPLETED | OUTPATIENT
Start: 2024-04-17 | End: 2024-04-17

## 2024-04-17 RX ORDER — METOCLOPRAMIDE HYDROCHLORIDE 5 MG/ML
10 INJECTION INTRAMUSCULAR; INTRAVENOUS ONCE
Status: COMPLETED | OUTPATIENT
Start: 2024-04-17 | End: 2024-04-17

## 2024-04-17 RX ORDER — FENTANYL CITRATE 50 UG/ML
50 INJECTION, SOLUTION INTRAMUSCULAR; INTRAVENOUS EVERY 30 MIN PRN
Status: DISCONTINUED | OUTPATIENT
Start: 2024-04-17 | End: 2024-04-17 | Stop reason: HOSPADM

## 2024-04-17 RX ADMIN — IOPAMIDOL 67 ML: 755 INJECTION, SOLUTION INTRAVENOUS at 15:12

## 2024-04-17 RX ADMIN — KETOROLAC TROMETHAMINE 15 MG: 15 INJECTION, SOLUTION INTRAMUSCULAR; INTRAVENOUS at 15:49

## 2024-04-17 RX ADMIN — SODIUM CHLORIDE 100 ML: 9 INJECTION, SOLUTION INTRAVENOUS at 15:12

## 2024-04-17 RX ADMIN — FENTANYL CITRATE 50 MCG: 50 INJECTION INTRAMUSCULAR; INTRAVENOUS at 15:23

## 2024-04-17 RX ADMIN — METOCLOPRAMIDE 10 MG: 5 INJECTION, SOLUTION INTRAMUSCULAR; INTRAVENOUS at 15:49

## 2024-04-17 RX ADMIN — DIPHENHYDRAMINE HYDROCHLORIDE 25 MG: 50 INJECTION INTRAMUSCULAR; INTRAVENOUS at 15:49

## 2024-04-17 ASSESSMENT — ACTIVITIES OF DAILY LIVING (ADL)
ADLS_ACUITY_SCORE: 35
ADLS_ACUITY_SCORE: 35

## 2024-04-17 NOTE — ED PROVIDER NOTES
ED Provider Note  [facility]      History     Chief Complaint   Patient presents with    Headache     Headache started sharply 1 hr prior to arrival; worst headache ever     HPI  Kayy Vieyra is a 44 year old male who has medical history significant for depression, history of prior occasional headaches, presenting the emergency department with acute onset of severe headache.  Patient describes this headache as the worst headache of his life, never experiencing similar types of pain previously.  Has had some occasional headaches, however never as severe.  He was just laying down when his headache had started.  He takes no daily medications.  He does have history of tobacco use, in addition to marijuana use.  Denies any recent trauma, fall, or other injury.  Denies any daily medication use.  Denies any alcohol use.  No other recent changes in his health.        Independent Historian:        Review of External Notes:          Allergies:  No Known Allergies    Problem List:    Patient Active Problem List    Diagnosis Date Noted    Recurrent major depression (H24) 11/06/2023     Priority: Medium    Osteoarthritis of fingers of both hands 07/01/2021     Priority: Medium    Bilateral carpal tunnel syndrome 03/27/2020     Priority: Medium    Chronic pain disorder 03/01/2019     Priority: Medium    Insomnia due to medical condition 03/01/2019     Priority: Medium    Restless legs 03/01/2019     Priority: Medium    Complex regional pain syndrome type 2 of right lower extremity 07/18/2018     Priority: Medium    Medical cannabis use 07/18/2018     Priority: Medium     Formatting of this note might be different from the original. Certified today      Chronic neck pain 07/01/2014     Priority: Medium    Vertebral compression fracture (H) 07/01/2014     Priority: Medium     Formatting of this note might be different from the original. Thoracic      Tobacco abuse: 1 ppd since 1993 10/03/2011      Priority: Medium    S/P appendectomy: July 15, 2011 10/03/2011     Priority: Medium    Lipoma of skin: on the back and arm 10/03/2011     Priority: Medium    Hand pain and numbness, bilateral 10/03/2011     Priority: Medium    CTS (carpal tunnel syndrome) 10/03/2011     Priority: Medium        Past Medical History:    No past medical history on file.    Past Surgical History:    Past Surgical History:   Procedure Laterality Date    APPENDECTOMY OPEN  July 2011    lipoma removal         Family History:    Family History   Problem Relation Age of Onset    Arthritis Mother         RA    Unknown/Adopted Maternal Grandmother     Unknown/Adopted Maternal Grandfather     Unknown/Adopted Paternal Grandmother     Unknown/Adopted Paternal Grandfather     Asthma No family hx of     C.A.D. No family hx of     Diabetes No family hx of     Hypertension No family hx of     Cerebrovascular Disease No family hx of     Breast Cancer No family hx of     Cancer - colorectal No family hx of     Prostate Cancer No family hx of     Alzheimer Disease No family hx of     Blood Disease No family hx of     Cancer No family hx of     Cardiovascular No family hx of     Circulatory No family hx of     Eye Disorder No family hx of     Gastrointestinal Disease No family hx of     Genitourinary Problems No family hx of     Heart Disease No family hx of     Lipids No family hx of     Musculoskeletal Disorder No family hx of     Neurologic Disorder No family hx of     Respiratory No family hx of     Thyroid Disease No family hx of        Social History:  Marital Status:  Single [1]  Social History     Tobacco Use    Smoking status: Every Day     Current packs/day: 1.00     Types: Cigarettes    Smokeless tobacco: Never    Tobacco comments:     1 pack per day   Substance Use Topics    Alcohol use: No    Drug use: No        Medications:    hydrocodone-acetaminophen 5-325 MG per tablet  Naproxen Sodium (ALEVE PO)          Review of Systems  A medically  "appropriate review of systems was performed with pertinent positives and negatives noted in the HPI, and all other systems negative.    Physical Exam   Patient Vitals for the past 24 hrs:   BP Temp Temp src Pulse Resp SpO2 Height Weight   04/17/24 1628 (!) 134/93 -- -- 89 27 98 % -- --   04/17/24 1613 132/86 -- -- 86 17 99 % -- --   04/17/24 1558 (!) 141/78 -- -- 98 21 98 % -- --   04/17/24 1543 104/78 -- -- 104 (!) 58 99 % -- --   04/17/24 1528 128/65 -- -- 100 (!) 108 100 % -- --   04/17/24 1513 (!) 150/88 -- -- 108 23 99 % -- --   04/17/24 1455 (!) 172/95 (!) 96.4  F (35.8  C) Tympanic 104 18 99 % 1.753 m (5' 9\") 68 kg (150 lb)          Physical Exam  General: alert and in  acute distress on arrival.  Uncomfortable appearing  Head: atraumatic, normocephalic  Lungs:  nonlabored  CV:  extremities warm and perfused  Abd: nondistended  Skin: no rashes, no diaphoresis and skin color normal  Neuro: Patient awake, alert, speech is fluent,.  GCS 15.  Psychiatric: affect/mood normal,        ED Course                 Procedures              EKG Interpretation:      Interpreted by Sandor Huffman MD  Symptoms at time of EKG: headache   Rhythm: Sinus tachycardia  Rate: 103  Axis: normal  Ectopy: none  Conduction: normal  ST Segments/ T Waves: No ST-T wave changes  Q Waves: none      Clinical Impression: sinus tachycardia         The patient has stroke symptoms:         ED Stroke specific documentation           NIHSS PDF     Patient last known well time: 1400pm  ED Provider first to bedside at: 1500pm      Thrombolytics:   Not given due to:   - minor/isolated/quickly resolving symptoms    If treating with thrombolytics: Ensure SBP<180 and DBP<105 prior to treatment with thrombolytics.  Administering thrombolytics after treatment with IV labetalol, hydralazine, or nicardipine is reasonable once BP control is established.    Endovascular Retrieval:  Not initiated due to absence of proximal vessel occlusion    National " Institutes of Health Stroke Scale (Baseline)  Time Performed: 1502     Score    Level of consciousness: (0)   Alert, keenly responsive    LOC questions: (0)   Answers both questions correctly    LOC commands: (0)   Performs both tasks correctly    Best gaze: (0)   Normal    Visual: (0)   No visual loss    Facial palsy: (0)   Normal symmetrical movements    Motor arm (left): (0)   No drift    Motor arm (right): (0)   No drift    Motor leg (left): (0)   No drift    Motor leg (right): (0)   No drift    Limb ataxia: (0)   Absent    Sensory: (0)   Normal- no sensory loss    Best language: (0)   Normal- no aphasia    Dysarthria: (0)   Normal    Extinction and inattention: (0)   No abnormality        Total Score:  0        Stroke Mimics were considered (including migraine headache, seizure disorder, hypoglycemia (or hyperglycemia), head or spinal trauma, CNS infection, Toxin ingestion and shock state (e.g. sepsis) .                   Results for orders placed or performed during the hospital encounter of 04/17/24 (from the past 24 hour(s))   Glucose by meter   Result Value Ref Range    GLUCOSE BY METER POCT 157 (H) 70 - 99 mg/dL   CBC with Platelets & Differential    Narrative    The following orders were created for panel order CBC with Platelets & Differential.  Procedure                               Abnormality         Status                     ---------                               -----------         ------                     CBC with platelets and d...[[id number]]  Abnormal            Final result                 Please view results for these tests on the individual orders.   Basic metabolic panel   Result Value Ref Range    Sodium 138 135 - 145 mmol/L    Potassium 3.5 3.4 - 5.3 mmol/L    Chloride 100 98 - 107 mmol/L    Carbon Dioxide (CO2) 30 (H) 22 - 29 mmol/L    Anion Gap 8 7 - 15 mmol/L    Urea Nitrogen 7.4 6.0 - 20.0 mg/dL    Creatinine 0.64 (L) 0.67 - 1.17 mg/dL    GFR Estimate >90 >60 mL/min/1.73m2     Calcium 8.6 8.6 - 10.0 mg/dL    Glucose 153 (H) 70 - 99 mg/dL   INR   Result Value Ref Range    INR 0.95 0.85 - 1.15   Partial thromboplastin time   Result Value Ref Range    aPTT 29 22 - 38 Seconds   Troponin T, High Sensitivity   Result Value Ref Range    Troponin T, High Sensitivity 7 <=22 ng/L   CBC with platelets and differential   Result Value Ref Range    WBC Count 12.2 (H) 4.0 - 11.0 10e3/uL    RBC Count 4.57 4.40 - 5.90 10e6/uL    Hemoglobin 14.7 13.3 - 17.7 g/dL    Hematocrit 42.4 40.0 - 53.0 %    MCV 93 78 - 100 fL    MCH 32.2 26.5 - 33.0 pg    MCHC 34.7 31.5 - 36.5 g/dL    RDW 12.6 10.0 - 15.0 %    Platelet Count 443 150 - 450 10e3/uL    % Neutrophils 84 %    % Lymphocytes 12 %    % Monocytes 0 %    % Eosinophils 3 %    % Basophils 0 %    % Immature Granulocytes 0 %    NRBCs per 100 WBC 0 <1 /100    Absolute Neutrophils 10.2 (H) 1.6 - 8.3 10e3/uL    Absolute Lymphocytes 1.4 0.8 - 5.3 10e3/uL    Absolute Monocytes 0.0 0.0 - 1.3 10e3/uL    Absolute Eosinophils 0.4 0.0 - 0.7 10e3/uL    Absolute Basophils 0.0 0.0 - 0.2 10e3/uL    Absolute Immature Granulocytes 0.1 <=0.4 10e3/uL    Absolute NRBCs 0.0 10e3/uL   CT Head w/o Contrast    Narrative    CT SCAN OF THE HEAD WITHOUT CONTRAST   4/17/2024 3:14 PM     HISTORY: Thunderclap headache. Worse headache of life. Code stroke to  evaluate for potential thrombolysis and thrombectomy.    TECHNIQUE: Axial images of the head and coronal reformations without  IV contrast material. Radiation dose for this scan was reduced using  automated exposure control, adjustment of the mA and/or kV according  to patient size, or iterative reconstruction technique.    COMPARISON: None.    FINDINGS: There is no evidence of intracranial hemorrhage, mass, acute  infarct or anomaly. The ventricles are normal in size, shape and  configuration. The brain parenchyma and subarachnoid spaces are  normal.     Partially visualized polypoid lesion in the right maxillary sinus,  which may  represent a retention cyst or polyp. Mild mucosal thickening  in the bilateral ethmoid sinuses. The mastoid and middle ear cavities  appear clear. The bony calvarium and bones of the skull base appear  intact.       Impression    IMPRESSION:   1. No CT findings of acute intracranial process.  2. Partially visualized opacification of the right maxillary sinus  with polypoid soft tissue.    The findings were discussed with Dr. Huffman by myself at approximately  3:32 PM 4/17/2024.    FLORENCIA CASTILLO MD         SYSTEM ID:  [id number]   CTA Head Neck with Contrast    Narrative    CT ANGIOGRAM OF THE HEAD AND NECK WITH CONTRAST  4/17/2024 3:21 PM     HISTORY: Severe headache. Code stroke to evaluate for potential  thrombolysis and thrombectomy.     TECHNIQUE: CT angiography with an injection of 67 mL Isovue-370 IV  with scans through the head and neck. Images were transferred to a  separate 3-D workstation where multiplanar reformations and 3-D images  were created. Estimates of carotid stenoses are made relative to the  distal internal carotid artery diameters except as noted. Radiation  dose for this scan was reduced using automated exposure control,  adjustment of the mA and/or kV according to patient size, or iterative  reconstruction technique.    COMPARISON: CT head of same day.     CT ANGIOGRAM HEAD FINDINGS: The intracranial internal carotid arteries  appear widely patent. The proximal branches of the bilateral anterior  cerebral and middle cerebral arteries appear patent. The dominant  right and developmentally smaller left vertebral arteries and the  basilar artery appear patent. Fetal origin of the right posterior  cerebral artery from the anterior circulation, a normal variant. No  high-grade proximal arterial stenosis/occlusion is identified. No  intracranial aneurysm or high flow vascular malformation is  identified. Probable prominent arachnoid granulation in the left  transverse sinus. Otherwise expected  enhancement of the major dural  venous sinuses.    CT ANGIOGRAM NECK FINDINGS:   The left vertebral artery arises directly from the aortic arch, a  normal variant. No stenosis at the origins of the great vessels.     Right carotid artery: The right common and internal carotid arteries  are patent. No significant stenosis or atherosclerotic disease in the  carotid artery.     Left carotid artery: The left common and internal carotid arteries are  patent. No significant stenosis or atherosclerotic disease in the  carotid artery.     Vertebral arteries: Vertebral arteries are patent without evidence of  dissection. No significant stenosis.     Other findings: Moderate sized polypoid lesion in the right maxillary  sinus likely represents a retention cyst or polyp. Smaller probable  retention cyst or polyp left maxillary sinus. Mild mucosal thickening  in the bilateral ethmoid sinuses. Plate and screw fixation hardware  along the mandible bilaterally. Partially visualized paraseptal  emphysematous changes in the upper lung zones. Multilevel degenerative  changes are noted in the cervical spine.      Impression    IMPRESSION:  1. No high-grade stenosis or large vessel occlusion of the major  intracranial arteries.   2. No intracranial aneurysm or high flow vascular malformation.  3. Anatomic variants of the [place], as described.  4. Patent cervical carotid and vertebral arteries without significant  stenosis.    FLORENCIA CASTILLO MD         SYSTEM ID:  [id number]       MEDICATIONS GIVEN IN THE EMERGENCY DEPARTMENT:  Medications   fentaNYL (PF) (SUBLIMAZE) injection 50 mcg (50 mcg Intravenous $Given 4/17/24 1523)   iopamidol (ISOVUE-370) solution 67 mL (67 mLs Intravenous $Given 4/17/24 1512)     And   sodium chloride 0.9 % bag for CT scan flush use (100 mLs As instructed $Given 4/17/24 1512)   ketorolac (TORADOL) injection 15 mg (15 mg Intravenous $Given 4/17/24 1549)   metoclopramide (REGLAN) injection 10 mg (10 mg  Intravenous $Given 4/17/24 0029)   diphenhydrAMINE (BENADRYL) injection 25 mg (25 mg Intravenous $Given 4/17/24 3641)           Independent Interpretation (X-rays, CTs, rhythm strip):  Independent impression shows no acute hemorrhage, or other acute findings.  Radiology impression normal    Consultations/Discussion of Management or Tests:  Stroke neurology consulted.  Had initial discussion with stroke neurologist, recommending CT, CT angiogram.  Repeat discussion with stroke neurologist at later time mention consideration for venous sinus thrombosis, and consider MRI of the head if ongoing symptoms       Social Determinants of Health affecting care:         Assessments & Plan (with Medical Decision Making)  44 year old male who presents to the Emergency Department for evaluation of acute onset of worst headache of patient's life.  This began 1 hour prior to ED arrival, at approximately 2 PM.  Given the thunderclap nature and acute onset of the most severe headache of patient's life, tier 1 code stroke was initiated for potential hemorrhagic stroke symptoms.  Patient was evaluated immediately upon ED arrival to the room.    I did have conversation with stroke neurologist shortly after 3 PM.  Discussed patient's presentation.  Currently without focal neurologic deficits, slightly hypertensive.    Patient was given initially IV fentanyl as we were awaiting CT scan results.  Fortunately, CT scan results are normal.  Patient was treated with additional migraine type medications with Toradol, Reglan, and Benadryl, and upon recheck is feeling much improved.  He does state that he has had some migraines that have been somewhat similar, however in a slightly different location, not as severe in nature.  Therefore, patient had presented with this increasingly severe pain, which fortunately was alleviated with IV analgesics as above.    Considered hospitalization, and further potential testing including MRI, with MRV of the  head, however given patient's improvement, I feel it is reasonable for discharge home, outpatient management, with return instructions discussed.       I have reviewed the nursing notes.    I have reviewed the findings, diagnosis, plan and need for follow up with the patient.       Critical Care time:  Critical Care Addendum    My initial assessment, based on my review of nursing observations, review of vital signs, focused history, physical exam, and discussion with stroke neurology , established that Kayy Vieyra has  severe headache, with the potential for stroke, subarachnoid hemorrhage, or other intracranial catastrophe , which requires immediate intervention, and therefore He is critically ill.     After the initial assessment, the care team initiated multiple lab tests and consulted with stroke neurology  to provide stabilization care. Due to the critical nature of this patient, I reassessed nursing observations, vital signs, physical exam, and neurologic status multiple times prior to He disposition.     Time also spent performing documentation, reviewing test results, discussion with consultants, and coordination of care.     Critical care time (excluding teaching time and procedures): 50 minutes.         NEW PRESCRIPTIONS STARTED AT TODAY'S ER VISIT  New Prescriptions    No medications on file       Final diagnoses:   Acute nonintractable headache, unspecified headache type   Other migraine with status migrainosus, intractable   Chronic pain disorder       4/17/2024   [facility] EMERGENCY DEPT       Sandor Huffman MD  04/17/24 5047       Sandor Huffman MD  04/17/24 6112

## 2024-04-17 NOTE — ED NOTES
MD at bedside upon pt being brought to room for stroke eval.     Pt has worst HA ever, for approx 1 hour.  Pt was laying down when headache occurred.  Took dual action advil PTA.  Neuros intact. A&Ox4.

## 2024-04-17 NOTE — ED TRIAGE NOTES
Headache started sharply 1 hr prior to arrival; worst headache ever     Triage Assessment (Adult)       Row Name 04/17/24 1454          Triage Assessment    Airway WDL WDL        Cardiac WDL    Cardiac WDL WDL        Cognitive/Neuro/Behavioral WDL    Cognitive/Neuro/Behavioral WDL X  worst headache ever

## 2024-04-17 NOTE — DISCHARGE INSTRUCTIONS
Continue outpatient over-the-counter medications as needed for headache.  Can also try Benadryl for pain.  Tylenol, ibuprofen, or other NSAIDs can also be attempted.    Follow-up with providers if ongoing symptoms.    Return if worsening.

## 2024-04-17 NOTE — CONSULTS
"  [facility]    Stroke Telephone Note    I was called by Sandor Huffman on 04/17/24 regarding patient Kayy Vieyra. The patient is a 44 year old male who presented to the ER with the worst headache of his life.  He has a nonfocal neuro exam per Dr. Huffman. It started about an hour PTA. He does have a migraine history and is a smoker    Vitals  BP: (!) 172/95   Pulse: 104   Resp: 18   Temp: (!) 96.4  F (35.8  C)   Weight: 68 kg (150 lb)    Stroke Code Data (for stroke code without tele)  Stroke code activated 04/17/24  1502   Stroke provider first response 04/17/24  1502   Last known normal 04/17/24  1259      Time of discovery (or onset of symptoms) 04/17/24  1300   Head CT read by Stroke Neuro Provider 04/17/24  1520   Was stroke code de-escalated? Yes  04/17/24  1557     Imaging Findings  CT head: no stroke or ICH  CTA head/neck: no LVO or significant stenosis    Intravenous Thrombolysis  Not given due to:   - lack of focal neurologic deficits    Endovascular Treatment  Not initiated due to absence of proximal vessel occlusion    Impression  Acute onset severe headache, initially suggestive of subarachnoid hemorrhage given description but none seen on CT    Recommendations   - Defer to ED provider regarding further SAH workup like LP, if no meningismus then yield is likely low. Symptomatic treatment of headache per ED as well.  - Check brain MRI and MRV head, call us back if stroke or venous sinus thrombosis seen    Case discussed with vascular neurology attending Dr. Phipps.    My recommendations are based on the information provided over the phone by Kayy BELTRAN Jarrell's in-person providers. They are not intended to replace the clinical judgment of his in-person providers. I was not requested to personally see or examine the patient at this time.     Georgiana Tan PA-C  Vascular Neurology    To page me or covering stroke neurology team member, click here: [facility]  Choose \"On " "Call\" tab at top, then select \"NEUROLOGY/ALL SITES\" from middle drop-down box, press Enter, then look for \"stroke\" or \"telestroke\" for your site.    "

## 2024-05-07 ENCOUNTER — APPOINTMENT (OUTPATIENT)
Dept: OCCUPATIONAL MEDICINE | Facility: CLINIC | Age: 44
End: 2024-05-07

## 2024-05-07 PROCEDURE — 99000 SPECIMEN HANDLING OFFICE-LAB: CPT | Performed by: NURSE PRACTITIONER

## 2024-06-07 ENCOUNTER — APPOINTMENT (OUTPATIENT)
Dept: CT IMAGING | Facility: CLINIC | Age: 44
End: 2024-06-07
Attending: STUDENT IN AN ORGANIZED HEALTH CARE EDUCATION/TRAINING PROGRAM
Payer: COMMERCIAL

## 2024-06-07 ENCOUNTER — HOSPITAL ENCOUNTER (EMERGENCY)
Facility: CLINIC | Age: 44
Discharge: HOME | End: 2024-06-07
Attending: STUDENT IN AN ORGANIZED HEALTH CARE EDUCATION/TRAINING PROGRAM | Admitting: STUDENT IN AN ORGANIZED HEALTH CARE EDUCATION/TRAINING PROGRAM
Payer: COMMERCIAL

## 2024-06-07 VITALS
TEMPERATURE: 98.3 F | HEART RATE: 97 BPM | SYSTOLIC BLOOD PRESSURE: 121 MMHG | WEIGHT: 165 LBS | DIASTOLIC BLOOD PRESSURE: 84 MMHG | HEIGHT: 69 IN | BODY MASS INDEX: 24.44 KG/M2 | OXYGEN SATURATION: 99 %

## 2024-06-07 DIAGNOSIS — R51.9 FACIAL PAIN: ICD-10-CM

## 2024-06-07 PROCEDURE — 70450 CT HEAD/BRAIN W/O DYE: CPT

## 2024-06-07 PROCEDURE — 70486 CT MAXILLOFACIAL W/O DYE: CPT

## 2024-06-07 PROCEDURE — 250N000013 HC RX MED GY IP 250 OP 250 PS 637: Performed by: STUDENT IN AN ORGANIZED HEALTH CARE EDUCATION/TRAINING PROGRAM

## 2024-06-07 PROCEDURE — 99284 EMERGENCY DEPT VISIT MOD MDM: CPT | Mod: 25 | Performed by: STUDENT IN AN ORGANIZED HEALTH CARE EDUCATION/TRAINING PROGRAM

## 2024-06-07 PROCEDURE — 99284 EMERGENCY DEPT VISIT MOD MDM: CPT | Performed by: STUDENT IN AN ORGANIZED HEALTH CARE EDUCATION/TRAINING PROGRAM

## 2024-06-07 PROCEDURE — 72125 CT NECK SPINE W/O DYE: CPT

## 2024-06-07 RX ORDER — HYDROXYZINE HYDROCHLORIDE 25 MG/1
50 TABLET, FILM COATED ORAL ONCE
Status: COMPLETED | OUTPATIENT
Start: 2024-06-07 | End: 2024-06-07

## 2024-06-07 RX ORDER — HYDROCODONE BITARTRATE AND ACETAMINOPHEN 5; 325 MG/1; MG/1
1 TABLET ORAL ONCE
Status: COMPLETED | OUTPATIENT
Start: 2024-06-07 | End: 2024-06-07

## 2024-06-07 RX ADMIN — HYDROXYZINE HYDROCHLORIDE 50 MG: 25 TABLET ORAL at 22:57

## 2024-06-07 RX ADMIN — HYDROCODONE BITARTRATE AND ACETAMINOPHEN 1 TABLET: 5; 325 TABLET ORAL at 21:05

## 2024-06-07 ASSESSMENT — COLUMBIA-SUICIDE SEVERITY RATING SCALE - C-SSRS
1. IN THE PAST MONTH, HAVE YOU WISHED YOU WERE DEAD OR WISHED YOU COULD GO TO SLEEP AND NOT WAKE UP?: NO
3. HAVE YOU BEEN THINKING ABOUT HOW YOU MIGHT KILL YOURSELF?: NO
4. HAVE YOU HAD THESE THOUGHTS AND HAD SOME INTENTION OF ACTING ON THEM?: NO
2. HAVE YOU ACTUALLY HAD ANY THOUGHTS OF KILLING YOURSELF IN THE PAST MONTH?: YES
5. HAVE YOU STARTED TO WORK OUT OR WORKED OUT THE DETAILS OF HOW TO KILL YOURSELF? DO YOU INTEND TO CARRY OUT THIS PLAN?: NO
6. HAVE YOU EVER DONE ANYTHING, STARTED TO DO ANYTHING, OR PREPARED TO DO ANYTHING TO END YOUR LIFE?: YES

## 2024-06-07 ASSESSMENT — ACTIVITIES OF DAILY LIVING (ADL)
ADLS_ACUITY_SCORE: 35

## 2024-06-08 NOTE — DISCHARGE INSTRUCTIONS
The patient is medically cleared for discharge back to [facility].  CT of the head, face and neck were all negative.  Recommend close monitoring per protocol to prevent any further self-harm.  Recommend Tylenol and NSAIDs for pain. Return to the ER with new or worsening symptoms

## 2024-06-08 NOTE — ED PROVIDER NOTES
History     Chief Complaint   Patient presents with    Jaw Pain    Facial Injury     HPI  Kayy Vieyra is a 44 year old male who has chronic pain, who presents to the emergency department from correctional facility for evaluation of a facial injury.  The patient states that he repetitively ran headfirst into a wall.  He denies suicidal ideation.  He states this was out of frustration.  He denies trying to hurt himself.  He states that he thinks he lost consciousness briefly.  He is currently complaining of right jaw pain.  He states that it hurts to open his mouth.  Reports a history of broken jaw and states it feels similar.  He is able to speak without difficulty.  He has a sore neck on the right side.  He denies weakness in the extremities.  No trouble walking.  Has a mild headache.  No vision issues.  States he had a bloody nose but this is resolved.  No trouble breathing.  No pain in the chest, abdomen or arms or legs.  He is not anticoagulated.    Allergies:  No Known Allergies    Problem List:    Patient Active Problem List    Diagnosis Date Noted    Recurrent major depression (H24) 11/06/2023     Priority: Medium    Osteoarthritis of fingers of both hands 07/01/2021     Priority: Medium    Bilateral carpal tunnel syndrome 03/27/2020     Priority: Medium    Chronic pain disorder 03/01/2019     Priority: Medium    Insomnia due to medical condition 03/01/2019     Priority: Medium    Restless legs 03/01/2019     Priority: Medium    Complex regional pain syndrome type 2 of right lower extremity 07/18/2018     Priority: Medium    Medical cannabis use 07/18/2018     Priority: Medium     Formatting of this note might be different from the original. Certified today      Chronic neck pain 07/01/2014     Priority: Medium    Vertebral compression fracture (H) 07/01/2014     Priority: Medium     Formatting of this note might be different from the original. Thoracic      Tobacco abuse: 1 ppd since 1993 10/03/2011      Priority: Medium    S/P appendectomy: July 15, 2011 10/03/2011     Priority: Medium    Lipoma of skin: on the back and arm 10/03/2011     Priority: Medium    Hand pain and numbness, bilateral 10/03/2011     Priority: Medium    CTS (carpal tunnel syndrome) 10/03/2011     Priority: Medium        Past Medical History:    No past medical history on file.    Past Surgical History:    Past Surgical History:   Procedure Laterality Date    APPENDECTOMY OPEN  July 2011    lipoma removal         Family History:    Family History   Problem Relation Age of Onset    Arthritis Mother         RA    Unknown/Adopted Maternal Grandmother     Unknown/Adopted Maternal Grandfather     Unknown/Adopted Paternal Grandmother     Unknown/Adopted Paternal Grandfather     Asthma No family hx of     C.A.D. No family hx of     Diabetes No family hx of     Hypertension No family hx of     Cerebrovascular Disease No family hx of     Breast Cancer No family hx of     Cancer - colorectal No family hx of     Prostate Cancer No family hx of     Alzheimer Disease No family hx of     Blood Disease No family hx of     Cancer No family hx of     Cardiovascular No family hx of     Circulatory No family hx of     Eye Disorder No family hx of     Gastrointestinal Disease No family hx of     Genitourinary Problems No family hx of     Heart Disease No family hx of     Lipids No family hx of     Musculoskeletal Disorder No family hx of     Neurologic Disorder No family hx of     Respiratory No family hx of     Thyroid Disease No family hx of        Social History:  Marital Status:  Single [1]  Social History     Tobacco Use    Smoking status: Every Day     Current packs/day: 1.00     Types: Cigarettes    Smokeless tobacco: Never    Tobacco comments:     1 pack per day   Substance Use Topics    Alcohol use: No    Drug use: No        Medications:    hydrocodone-acetaminophen 5-325 MG per tablet  Naproxen Sodium (ALEVE PO)          Review of Systems  See  "HPI  Physical Exam   BP: (!) 141/105  Pulse: 95  Temp: 98.3  F (36.8  C)  Height: 175.3 cm (5' 9\")  Weight: 74.8 kg (165 lb)  SpO2: 99 %      Physical Exam  /84   Pulse 97   Temp 98.3  F (36.8  C) (Oral)   Ht 1.753 m (5' 9\")   Wt 74.8 kg (165 lb)   SpO2 99%   BMI 24.37 kg/m    General: alert, interactive, in no apparent distress  Head: Nose is slightly swollen with dried blood at the right nostril.  There is no septal hematoma.  Bilateral nares are patent.  Swelling to the right side of the mandible with associated tenderness.  No open wounds.  Able to open his mouth   Nose: no rhinorrhea or epistaxis  Ears: no external auditory canal discharge or bleeding.    Eyes: Sclera nonicteric. Conjunctiva noninjected. PERRL, EOMI  Mouth: no tonsillar erythema, edema, or exudate.  Moist mucous membranes.  Adentulous  Neck: supple, moving spontaneously with right-sided soft tissue tenderness.  No midline cervical tenderness  Lungs: No increased work of breathing.  Clear to auscultation bilaterally.  CV: RRR, peripheral pulses palpable and symmetric  Abdomen: soft, nt, nd, no guarding or rebound.   Extremities: Warm and well-perfused.  No edema or calf tenderness.  Skin: no rash or diaphoresis  Neuro: CN II-XII grossly intact, strength 5/5 in UE and LEs bilaterally, sensation intact to light touch in UE and LEs bilaterally;     ED Course        Procedures             Critical Care time:  none             Results for orders placed or performed during the hospital encounter of 06/07/24 (from the past 24 hour(s))   CT Head w/o Contrast    Narrative    EXAM: CT HEAD W/O CONTRAST, CT FACIAL BONES WITHOUT CONTRAST, CT CERVICAL SPINE W/O CONTRAST  LOCATION: [facility]  DATE: 6/7/2024    INDICATION: Chronic pain. Struck head on wall. Loss of consciousness. Right jaw pain. Sore neck. Mild headache. Bloody nose.  COMPARISON: 4/17/2017.  TECHNIQUE:   1) Routine CT Head without IV contrast. " Multiplanar reformats. Dose reduction techniques were used.  2) Routine CT Facial Bones without IV contrast. Multiplanar reformats. Dose reduction techniques were used.  3) Routine CT Cervical Spine without IV contrast. Multiplanar reformats. Dose reduction techniques were used.    FINDINGS:  HEAD CT:   INTRACRANIAL CONTENTS: No intracranial hemorrhage, extraaxial collection, or mass effect.  No CT evidence of acute infarct. Normal parenchymal density for age. The ventricles and sulci are normal for age.     OSSEOUS STRUCTURES/SOFT TISSUES: Midline frontal scalp swelling. No acute calvarial fracture.     FACIAL BONE CT:  OSSEOUS STRUCTURES/SOFT TISSUES: No localized soft tissue swelling/inflammation. No acute facial bone fracture or malalignment. Prior internal fixation of the mandible. No evidence of hardware fracture or loosening.    ORBITAL CONTENTS: No acute abnormality.    SINUSES: Polypoid mucosal thickening in the right maxillary sinus with near-complete opacification. Mild mucosal thickening scattered throughout the remaining paranasal sinuses. Trace nonspecific fluid in the maxillary sinuses.    CERVICAL SPINE CT: Motion degraded exam.  VERTEBRA: Straightening of the cervical spine lordosis with degenerative anterolisthesis of C7 on T1. Vertebral body heights are preserved. No definite acute displaced fracture.     CANAL/FORAMINA: Multilevel degenerative changes of the cervical spine with at least mild canal stenosis at C6-C7. Moderate-to-severe bilateral neural foraminal narrowing at C5-C6 and on the left at C6-C7.    PARASPINAL: No acute extraspinal abnormality. Emphysematous changes of the lungs.      Impression    IMPRESSION:  HEAD CT:  1.  No acute intracranial process.    FACIAL BONE CT:  1.  No evidence of an acute displaced fracture.  2.  Postoperative changes of the mandible without evidence of hardware fracture or loosening.    CERVICAL SPINE CT:  1.  Mildly motion degraded exam. No definite acute  displaced fracture.  2.  Degenerative changes, as described.   CT Cervical Spine w/o Contrast    Narrative    EXAM: CT HEAD W/O CONTRAST, CT FACIAL BONES WITHOUT CONTRAST, CT CERVICAL SPINE W/O CONTRAST  LOCATION: [facility]  DATE: 6/7/2024    INDICATION: Chronic pain. Struck head on wall. Loss of consciousness. Right jaw pain. Sore neck. Mild headache. Bloody nose.  COMPARISON: 4/17/2017.  TECHNIQUE:   1) Routine CT Head without IV contrast. Multiplanar reformats. Dose reduction techniques were used.  2) Routine CT Facial Bones without IV contrast. Multiplanar reformats. Dose reduction techniques were used.  3) Routine CT Cervical Spine without IV contrast. Multiplanar reformats. Dose reduction techniques were used.    FINDINGS:  HEAD CT:   INTRACRANIAL CONTENTS: No intracranial hemorrhage, extraaxial collection, or mass effect.  No CT evidence of acute infarct. Normal parenchymal density for age. The ventricles and sulci are normal for age.     OSSEOUS STRUCTURES/SOFT TISSUES: Midline frontal scalp swelling. No acute calvarial fracture.     FACIAL BONE CT:  OSSEOUS STRUCTURES/SOFT TISSUES: No localized soft tissue swelling/inflammation. No acute facial bone fracture or malalignment. Prior internal fixation of the mandible. No evidence of hardware fracture or loosening.    ORBITAL CONTENTS: No acute abnormality.    SINUSES: Polypoid mucosal thickening in the right maxillary sinus with near-complete opacification. Mild mucosal thickening scattered throughout the remaining paranasal sinuses. Trace nonspecific fluid in the maxillary sinuses.    CERVICAL SPINE CT: Motion degraded exam.  VERTEBRA: Straightening of the cervical spine lordosis with degenerative anterolisthesis of C7 on T1. Vertebral body heights are preserved. No definite acute displaced fracture.     CANAL/FORAMINA: Multilevel degenerative changes of the cervical spine with at least mild canal stenosis at C6-C7.  Moderate-to-severe bilateral neural foraminal narrowing at C5-C6 and on the left at C6-C7.    PARASPINAL: No acute extraspinal abnormality. Emphysematous changes of the lungs.      Impression    IMPRESSION:  HEAD CT:  1.  No acute intracranial process.    FACIAL BONE CT:  1.  No evidence of an acute displaced fracture.  2.  Postoperative changes of the mandible without evidence of hardware fracture or loosening.    CERVICAL SPINE CT:  1.  Mildly motion degraded exam. No definite acute displaced fracture.  2.  Degenerative changes, as described.   CT Facial Bones without Contrast    Narrative    EXAM: CT HEAD W/O CONTRAST, CT FACIAL BONES WITHOUT CONTRAST, CT CERVICAL SPINE W/O CONTRAST  LOCATION: [facility]  DATE: 6/7/2024    INDICATION: Chronic pain. Struck head on wall. Loss of consciousness. Right jaw pain. Sore neck. Mild headache. Bloody nose.  COMPARISON: 4/17/2017.  TECHNIQUE:   1) Routine CT Head without IV contrast. Multiplanar reformats. Dose reduction techniques were used.  2) Routine CT Facial Bones without IV contrast. Multiplanar reformats. Dose reduction techniques were used.  3) Routine CT Cervical Spine without IV contrast. Multiplanar reformats. Dose reduction techniques were used.    FINDINGS:  HEAD CT:   INTRACRANIAL CONTENTS: No intracranial hemorrhage, extraaxial collection, or mass effect.  No CT evidence of acute infarct. Normal parenchymal density for age. The ventricles and sulci are normal for age.     OSSEOUS STRUCTURES/SOFT TISSUES: Midline frontal scalp swelling. No acute calvarial fracture.     FACIAL BONE CT:  OSSEOUS STRUCTURES/SOFT TISSUES: No localized soft tissue swelling/inflammation. No acute facial bone fracture or malalignment. Prior internal fixation of the mandible. No evidence of hardware fracture or loosening.    ORBITAL CONTENTS: No acute abnormality.    SINUSES: Polypoid mucosal thickening in the right maxillary sinus with near-complete  opacification. Mild mucosal thickening scattered throughout the remaining paranasal sinuses. Trace nonspecific fluid in the maxillary sinuses.    CERVICAL SPINE CT: Motion degraded exam.  VERTEBRA: Straightening of the cervical spine lordosis with degenerative anterolisthesis of C7 on T1. Vertebral body heights are preserved. No definite acute displaced fracture.     CANAL/FORAMINA: Multilevel degenerative changes of the cervical spine with at least mild canal stenosis at C6-C7. Moderate-to-severe bilateral neural foraminal narrowing at C5-C6 and on the left at C6-C7.    PARASPINAL: No acute extraspinal abnormality. Emphysematous changes of the lungs.      Impression    IMPRESSION:  HEAD CT:  1.  No acute intracranial process.    FACIAL BONE CT:  1.  No evidence of an acute displaced fracture.  2.  Postoperative changes of the mandible without evidence of hardware fracture or loosening.    CERVICAL SPINE CT:  1.  Mildly motion degraded exam. No definite acute displaced fracture.  2.  Degenerative changes, as described.       Medications   HYDROcodone-acetaminophen (NORCO) 5-325 MG per tablet 1 tablet (1 tablet Oral $Given 6/7/24 8422)   hydrOXYzine HCl (ATARAX) tablet 50 mg (50 mg Oral $Given 6/7/24 9216)       Assessments & Plan (with Medical Decision Making)     I have reviewed the nursing notes.    I have reviewed the findings, diagnosis, plan and need for follow up with the patient.          Kayy Vieyra is a 44 year old male who has chronic pain, who presents to the emergency department from correctional facility for evaluation of a facial injury.  Vital signs reviewed notable for hypertension, otherwise reassuring.  Patient has a normal neurological exam.  Imaging of the head, neck and face was obtained and reviewed.  Patient does not have any acute cervical fracture.  No calvarial fracture or intracranial hemorrhage.  No acute facial fractures.  Mandible hardware appears to be appropriately positioned.   Patient was given pain medication and hydroxyzine for his anxiety.  Reassured him by his workup.  He continues to deny suicidal ideation.  He states he did this out of frustration.  We discussed other coping mechanisms.  Recommended close monitoring by staff at [facility].  Return precautions discussed.  All questions answered.  Patient discharged in stable condition back to [facility].        Discharge Medication List as of 6/7/2024 10:59 PM          Final diagnoses:   Facial pain       6/7/2024   [facility] EMERGENCY DEPT       Piero Mendoza MD  06/08/24 0206

## 2024-06-08 NOTE — ED NOTES
Patient's wife dropped off patient's suboxone, clonidine, hydroxyzine, and patient's glasses.  Writer handed to law enforcement that is with patient.

## 2024-06-08 NOTE — ED TRIAGE NOTES
"Pt \"ran into the wall and hurt nose and jaw.\"  Pt admits he ran into the wall out of frustration from his situation.  Hx of broken jaw and states it feels like that.  No LOC.  Abrasion to nose and forehead.     Triage Assessment (Adult)       Row Name 06/07/24 1930          Triage Assessment    Airway WDL WDL        Respiratory WDL    Respiratory WDL WDL        Cognitive/Neuro/Behavioral WDL    Cognitive/Neuro/Behavioral WDL WDL                     "

## 2024-06-08 NOTE — ED NOTES
Pt returned from imaging, ice pack given, awaiting results, facility guards presents, handcuffs remain in place, call light within reach, will continue to monitor and assist as needed.

## 2024-07-06 ENCOUNTER — HEALTH MAINTENANCE LETTER (OUTPATIENT)
Age: 44
End: 2024-07-06

## 2024-12-20 ENCOUNTER — HOSPITAL ENCOUNTER (EMERGENCY)
Facility: CLINIC | Age: 44
Discharge: HOME | End: 2024-12-20
Attending: EMERGENCY MEDICINE | Admitting: EMERGENCY MEDICINE
Payer: COMMERCIAL

## 2024-12-20 ENCOUNTER — APPOINTMENT (OUTPATIENT)
Dept: CT IMAGING | Facility: CLINIC | Age: 44
End: 2024-12-20
Attending: EMERGENCY MEDICINE
Payer: COMMERCIAL

## 2024-12-20 VITALS
TEMPERATURE: 97.8 F | WEIGHT: 179 LBS | HEART RATE: 70 BPM | SYSTOLIC BLOOD PRESSURE: 120 MMHG | HEIGHT: 69 IN | DIASTOLIC BLOOD PRESSURE: 69 MMHG | RESPIRATION RATE: 20 BRPM | OXYGEN SATURATION: 95 % | BODY MASS INDEX: 26.51 KG/M2
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
## 2024-12-20 DIAGNOSIS — S01.81XA LACERATION OF FOREHEAD, INITIAL ENCOUNTER: ICD-10-CM

## 2024-12-20 DIAGNOSIS — R55 SYNCOPE, UNSPECIFIED SYNCOPE TYPE: ICD-10-CM

## 2024-12-20 LAB
ANION GAP SERPL CALCULATED.3IONS-SCNC: 13 MMOL/L (ref 7–15)
ATRIAL RATE - MUSE: 277 BPM
BASOPHILS # BLD AUTO: 0 10E3/UL (ref 0–0.2)
BASOPHILS NFR BLD AUTO: 0 %
BUN SERPL-MCNC: 8.2 MG/DL (ref 6–20)
CALCIUM SERPL-MCNC: 8.8 MG/DL (ref 8.8–10.4)
CHLORIDE SERPL-SCNC: 98 MMOL/L (ref 98–107)
CREAT SERPL-MCNC: 0.76 MG/DL (ref 0.67–1.17)
DIASTOLIC BLOOD PRESSURE - MUSE: NORMAL MMHG
EGFRCR SERPLBLD CKD-EPI 2021: >90 ML/MIN/1.73M2
EOSINOPHIL # BLD AUTO: 0.2 10E3/UL (ref 0–0.7)
EOSINOPHIL NFR BLD AUTO: 2 %
ERYTHROCYTE [DISTWIDTH] IN BLOOD BY AUTOMATED COUNT: 12.9 % (ref 10–15)
GLUCOSE SERPL-MCNC: 160 MG/DL (ref 70–99)
HCO3 SERPL-SCNC: 21 MMOL/L (ref 22–29)
HCT VFR BLD AUTO: 38.6 % (ref 40–53)
HGB BLD-MCNC: 13.7 G/DL (ref 13.3–17.7)
IMM GRANULOCYTES # BLD: 0 10E3/UL
IMM GRANULOCYTES NFR BLD: 0 %
INTERPRETATION ECG - MUSE: NORMAL
LYMPHOCYTES # BLD AUTO: 3 10E3/UL (ref 0.8–5.3)
LYMPHOCYTES NFR BLD AUTO: 29 %
MCH RBC QN AUTO: 32.3 PG (ref 26.5–33)
MCHC RBC AUTO-ENTMCNC: 35.5 G/DL (ref 31.5–36.5)
MCV RBC AUTO: 91 FL (ref 78–100)
MONOCYTES # BLD AUTO: 0.8 10E3/UL (ref 0–1.3)
MONOCYTES NFR BLD AUTO: 8 %
NEUTROPHILS # BLD AUTO: 6.2 10E3/UL (ref 1.6–8.3)
NEUTROPHILS NFR BLD AUTO: 60 %
NRBC # BLD AUTO: 0 10E3/UL
NRBC BLD AUTO-RTO: 0 /100
P AXIS - MUSE: 49 DEGREES
PLATELET # BLD AUTO: 257 10E3/UL (ref 150–450)
POTASSIUM SERPL-SCNC: 3.6 MMOL/L (ref 3.4–5.3)
PR INTERVAL - MUSE: NORMAL MS
QRS DURATION - MUSE: 88 MS
QT - MUSE: 420 MS
QTC - MUSE: 453 MS
R AXIS - MUSE: 44 DEGREES
RBC # BLD AUTO: 4.24 10E6/UL (ref 4.4–5.9)
SODIUM SERPL-SCNC: 132 MMOL/L (ref 135–145)
SYSTOLIC BLOOD PRESSURE - MUSE: NORMAL MMHG
T AXIS - MUSE: 55 DEGREES
TROPONIN T SERPL HS-MCNC: 6 NG/L
VENTRICULAR RATE- MUSE: 70 BPM
WBC # BLD AUTO: 10.2 10E3/UL (ref 4–11)

## 2024-12-20 PROCEDURE — 99284 EMERGENCY DEPT VISIT MOD MDM: CPT | Mod: 25 | Performed by: EMERGENCY MEDICINE

## 2024-12-20 PROCEDURE — 85004 AUTOMATED DIFF WBC COUNT: CPT | Performed by: EMERGENCY MEDICINE

## 2024-12-20 PROCEDURE — 84484 ASSAY OF TROPONIN QUANT: CPT | Performed by: EMERGENCY MEDICINE

## 2024-12-20 PROCEDURE — 12013 RPR F/E/E/N/L/M 2.6-5.0 CM: CPT | Performed by: EMERGENCY MEDICINE

## 2024-12-20 PROCEDURE — 80048 BASIC METABOLIC PNL TOTAL CA: CPT | Performed by: EMERGENCY MEDICINE

## 2024-12-20 PROCEDURE — 93010 ELECTROCARDIOGRAM REPORT: CPT | Mod: XU | Performed by: EMERGENCY MEDICINE

## 2024-12-20 PROCEDURE — 70450 CT HEAD/BRAIN W/O DYE: CPT

## 2024-12-20 PROCEDURE — 93005 ELECTROCARDIOGRAM TRACING: CPT | Performed by: EMERGENCY MEDICINE

## 2024-12-20 PROCEDURE — 85048 AUTOMATED LEUKOCYTE COUNT: CPT | Performed by: EMERGENCY MEDICINE

## 2024-12-20 PROCEDURE — 82374 ASSAY BLOOD CARBON DIOXIDE: CPT | Performed by: EMERGENCY MEDICINE

## 2024-12-20 PROCEDURE — 99285 EMERGENCY DEPT VISIT HI MDM: CPT | Mod: 25 | Performed by: EMERGENCY MEDICINE

## 2024-12-20 PROCEDURE — 36415 COLL VENOUS BLD VENIPUNCTURE: CPT | Performed by: EMERGENCY MEDICINE

## 2024-12-20 ASSESSMENT — ACTIVITIES OF DAILY LIVING (ADL)
ADLS_ACUITY_SCORE: 41
ADLS_ACUITY_SCORE: 41

## 2024-12-20 ASSESSMENT — COLUMBIA-SUICIDE SEVERITY RATING SCALE - C-SSRS
2. HAVE YOU ACTUALLY HAD ANY THOUGHTS OF KILLING YOURSELF IN THE PAST MONTH?: NO
1. IN THE PAST MONTH, HAVE YOU WISHED YOU WERE DEAD OR WISHED YOU COULD GO TO SLEEP AND NOT WAKE UP?: NO
6. HAVE YOU EVER DONE ANYTHING, STARTED TO DO ANYTHING, OR PREPARED TO DO ANYTHING TO END YOUR LIFE?: NO

## 2024-12-20 NOTE — ED PROVIDER NOTES
ED Provider Note  [facility]      History     Chief Complaint   Patient presents with    Fall     HPI  Kayy Vieyra is a 44 year old male who has medical history significant for polysubstance abuse, with history of inpatient treatment before in the past, with recent psychiatry telemedicine visit from December 11.  Patient now presents to the emergency department after he had reported syncopal episode during the overnight hours.  Patient had got up to have a snack, and recalls feeling as if he was with his vision that was going out, feeling lightheaded, and subsequently had syncopal episode.  Wife had come in asking what it happened, and patient was pale in appearance, attempted to stand back up, and passed out again.  Therefore, EMS was called, and patient was sent to the emergency department for further evaluation.  Patient does tell me that he was recently restarted on various different medications.  He only began these a few days ago.  Denies any alcohol, or drug use recently.  His December 11 telemedicine visit was reviewed, with medications as listed below.  Patient does have history of depression, anxiety, PTSD, and history of TBI in 2022.      -- Continue Duloxetine 60 mg daily  -- Continue Buspar 5 mg twice daily  -- Continue Clonidine 0.1 mg at bedtime   -- Continue Gabapentin 600 mg three times daily  -- Continue Guanfacine 1 mg at bedtime   -- Continue Naltrexone 50 mg daily  -- Continue Seroquel 50 mg at bedtime   -- Continue Vyvanse 70 mg daily, was reportedly prescribed 60 mg in error. I will need to see records and will also need urine drug screen.     Independent Historian:        Review of External Notes:          Allergies:  No Known Allergies    Problem List:    Patient Active Problem List    Diagnosis Date Noted    Recurrent major depression (H) 11/06/2023     Priority: Medium    Osteoarthritis of fingers of both hands 07/01/2021     Priority: Medium    Bilateral  carpal tunnel syndrome 03/27/2020     Priority: Medium    Chronic pain disorder 03/01/2019     Priority: Medium    Insomnia due to medical condition 03/01/2019     Priority: Medium    Restless legs 03/01/2019     Priority: Medium    Complex regional pain syndrome type 2 of right lower extremity 07/18/2018     Priority: Medium    Medical cannabis use 07/18/2018     Priority: Medium     Formatting of this note might be different from the original. Certified today      Chronic neck pain 07/01/2014     Priority: Medium    Vertebral compression fracture (H) 07/01/2014     Priority: Medium     Formatting of this note might be different from the original. Thoracic      Tobacco abuse: 1 ppd since 1993 10/03/2011     Priority: Medium    S/P appendectomy: July 15, 2011 10/03/2011     Priority: Medium    Lipoma of skin: on the back and arm 10/03/2011     Priority: Medium    Hand pain and numbness, bilateral 10/03/2011     Priority: Medium    CTS (carpal tunnel syndrome) 10/03/2011     Priority: Medium        Past Medical History:    No past medical history on file.    Past Surgical History:    Past Surgical History:   Procedure Laterality Date    APPENDECTOMY OPEN  July 2011    lipoma removal         Family History:    Family History   Problem Relation Age of Onset    Arthritis Mother         RA    Unknown/Adopted Maternal Grandmother     Unknown/Adopted Maternal Grandfather     Unknown/Adopted Paternal Grandmother     Unknown/Adopted Paternal Grandfather     Asthma No family hx of     C.A.D. No family hx of     Diabetes No family hx of     Hypertension No family hx of     Cerebrovascular Disease No family hx of     Breast Cancer No family hx of     Cancer - colorectal No family hx of     Prostate Cancer No family hx of     Alzheimer Disease No family hx of     Blood Disease No family hx of     Cancer No family hx of     Cardiovascular No family hx of     Circulatory No family hx of     Eye Disorder No family hx of      "Gastrointestinal Disease No family hx of     Genitourinary Problems No family hx of     Heart Disease No family hx of     Lipids No family hx of     Musculoskeletal Disorder No family hx of     Neurologic Disorder No family hx of     Respiratory No family hx of     Thyroid Disease No family hx of        Social History:  Marital Status:  Single [1]  Social History     Tobacco Use    Smoking status: Every Day     Current packs/day: 0.75     Types: Cigarettes    Smokeless tobacco: Never    Tobacco comments:     1 pack per day   Vaping Use    Vaping status: Never Used   Substance Use Topics    Alcohol use: No    Drug use: No        Medications:    gabapentin (NEURONTIN) 300 MG capsule  hydrocodone-acetaminophen 5-325 MG per tablet  Naproxen Sodium (ALEVE PO)  VYVANSE 60 MG capsule          Review of Systems  A medically appropriate review of systems was performed with pertinent positives and negatives noted in the HPI, and all other systems negative.    Physical Exam   Patient Vitals for the past 24 hrs:   BP Temp Temp src Pulse Resp SpO2 Height Weight   12/20/24 0333 120/69 -- -- 70 -- 95 % -- --   12/20/24 0300 119/68 -- -- 71 20 96 % -- --   12/20/24 0239 120/71 97.8  F (36.6  C) Oral 73 16 97 % 1.753 m (5' 9\") 81.2 kg (179 lb)          Physical Exam  General: alert and in mild acute distress on arrival  Head: Forehead laceration present, measuring 3 cm in length normocephalic  Lungs:  nonlabored  CV:  extremities warm and perfused  Abd: nondistended  Skin: no rashes, no diaphoresis and skin color normal  Neuro: Patient awake, alert, speech is fluent, GCS 15  Psychiatric: affect/mood normal,        ED Course                 Procedures  EKG, reviewed by myself shows normal sinus rhythm.  Rate 70 bpm.  Nonspecific [id number]-[id number] changes.  No ectopy.  No acute ischemic appearing changes.        [facility] Procedure Note        Laceration Repair:    Performed by: Sandor Huffman MD  Authorized by: Sandor Gupta " MD Nathanael  Consent given by: Patient who states understanding of the procedure being performed after discussing the risks, benefits and alternatives.    Preparation: Patient was prepped and draped in usual sterile fashion.  Irrigation solution: saline    Body area:forehead  Laceration length:3cm  Contamination: The wound is not contaminated.  Foreign bodies:none  Tendon involvement: none    Debridement: none  Skin closure: Closed with Wound adhesive  Technique: Wound adhesive  Approximation: close  Approximation difficulty: simple    Patient tolerance: Patient tolerated the procedure well with no immediate complications.                  Results for orders placed or performed during the hospital encounter of 12/20/24 (from the past 24 hours)   EKG 12-lead, tracing only   Result Value Ref Range    Systolic Blood Pressure  mmHg    Diastolic Blood Pressure  mmHg    Ventricular Rate 70 BPM    Atrial Rate 277 BPM    AL Interval  ms    QRS Duration 88 ms     ms    QTc 453 ms    P Axis 49 degrees    R AXIS 44 degrees    T Axis 55 degrees    Interpretation ECG       Atrial flutter with variable A-V block  Abnormal ECG  No previous ECGs available     CBC with platelets differential    Narrative    The following orders were created for panel order CBC with platelets differential.  Procedure                               Abnormality         Status                     ---------                               -----------         ------                     CBC with platelets and d...[[id number]]  Abnormal            Final result                 Please view results for these tests on the individual orders.   Basic metabolic panel   Result Value Ref Range    Sodium 132 (L) 135 - 145 mmol/L    Potassium 3.6 3.4 - 5.3 mmol/L    Chloride 98 98 - 107 mmol/L    Carbon Dioxide (CO2) 15 (L) 22 - 29 mmol/L    Anion Gap 19 (H) 7 - 15 mmol/L    Urea Nitrogen 8.2 6.0 - 20.0 mg/dL    Creatinine 0.76 0.67 - 1.17 mg/dL    GFR Estimate >90  >60 mL/min/1.73m2    Calcium 8.8 8.8 - 10.4 mg/dL    Glucose 160 (H) 70 - 99 mg/dL   Troponin T, High Sensitivity   Result Value Ref Range    Troponin T, High Sensitivity 6 <=22 ng/L   CBC with platelets and differential   Result Value Ref Range    WBC Count 10.2 4.0 - 11.0 10e3/uL    RBC Count 4.24 (L) 4.40 - 5.90 10e6/uL    Hemoglobin 13.7 13.3 - 17.7 g/dL    Hematocrit 38.6 (L) 40.0 - 53.0 %    MCV 91 78 - 100 fL    MCH 32.3 26.5 - 33.0 pg    MCHC 35.5 31.5 - 36.5 g/dL    RDW 12.9 10.0 - 15.0 %    Platelet Count 257 150 - 450 10e3/uL    % Neutrophils 60 %    % Lymphocytes 29 %    % Monocytes 8 %    % Eosinophils 2 %    % Basophils 0 %    % Immature Granulocytes 0 %    NRBCs per 100 WBC 0 <1 /100    Absolute Neutrophils 6.2 1.6 - 8.3 10e3/uL    Absolute Lymphocytes 3.0 0.8 - 5.3 10e3/uL    Absolute Monocytes 0.8 0.0 - 1.3 10e3/uL    Absolute Eosinophils 0.2 0.0 - 0.7 10e3/uL    Absolute Basophils 0.0 0.0 - 0.2 10e3/uL    Absolute Immature Granulocytes 0.0 <=0.4 10e3/uL    Absolute NRBCs 0.0 10e3/uL   Head CT w/o contrast    Narrative    EXAM: CT HEAD W/O CONTRAST  LOCATION: [facility]  DATE: 12/20/2024    INDICATION: fall.  syncope.  forehead laceration.  COMPARISON: 06/07/2024  TECHNIQUE: Routine CT Head without IV contrast. Multiplanar reformats. Dose reduction techniques were used.    FINDINGS:  INTRACRANIAL CONTENTS: No intracranial hemorrhage, extraaxial collection, or mass effect stable small dystrophic calcification in the posterior right occipital lobe.  No CT evidence of acute infarct. Normal parenchymal attenuation. Normal ventricles and   sulci.     VISUALIZED ORBITS/SINUSES/MASTOIDS: Orbits are incompletely imaged. No paranasal sinus mucosal disease. No middle ear or mastoid effusion.    BONES/SOFT TISSUES: Laceration along the forehead just to the left of midline with very mild associated soft tissue swelling and subcutaneous emphysema. No skull fracture.      Impression     IMPRESSION:  1.  No acute intracranial process.  2.  Forehead scalp laceration.       MEDICATIONS GIVEN IN THE EMERGENCY DEPARTMENT:  Medications - No data to display        Independent Interpretation (X-rays, CTs, rhythm strip):  CT head images personally reviewed.  No acute intracranial findings.    Consultations/Discussion of Management or Tests:  None       Social Determinants of Health affecting care:         Assessments & Plan (with Medical Decision Making)  44 year old male who presents to the Emergency Department for evaluation of syncopal episode.  Patient presents with some hyperesthesias of the body.  Also had syncopal episode after getting up from bed during the overnight hours, stood back up, and subsequently had syncopal episode.  Based on the syncope, with complaints of headache, CT scan of the head is performed.  This is fortunately without any evidence of acute intracranial abnormality.  Patient does state that he recently restarted medications as above.  This may be contributing to some of patient's potential for near syncope/syncope.  He is otherwise vitally stable during his ED course.  Laboratory workup is negative, with basic metabolic panel, CBC, and troponin which are normal.    Wound was repaired as documented above.  EKG unremarkable.  Reassurance provided to the patient.  Uncertain exact ultimate cause, however feel this may be component of vasovagal versus other medication side effect.  Patient encouraged close monitoring, and return precautions are discussed.       I have reviewed the nursing notes.    I have reviewed the findings, diagnosis, plan and need for follow up with the patient.         Medical Decision Making  The patient's presentation was of moderate complexity (an acute complicated injury).    The patient's evaluation involved:  ordering and/or review of 3+ test(s) in this encounter (see separate area of note for details)    The patient's management necessitated moderate  risk (a decision regarding minor procedure (laceration repair) with risk factors of history of TBI, and polysubstance abuse).        NEW PRESCRIPTIONS STARTED AT TODAY'S ER VISIT  Discharge Medication List as of 12/20/2024  4:11 AM          Final diagnoses:   Fall, initial encounter   Laceration of forehead, initial encounter   Syncope, unspecified syncope type       12/20/2024   [facility] EMERGENCY DEPT       Sandor Huffman MD  12/20/24 0441

## 2024-12-20 NOTE — DISCHARGE INSTRUCTIONS
The glue should disappear on its own.    Follow-up with your primary care providers with regards to medication management.    CT scan of the head looked okay.    Be seen if new or worsening symptoms develop

## 2024-12-20 NOTE — ED TRIAGE NOTES
Arrives by EMS for 2 falls about an hour ago. States he was getting up to get a snack. Has laceration to forehead and left knee pain      Triage Assessment (Adult)       Row Name 12/20/24 0241          Triage Assessment    Airway WDL WDL        Respiratory WDL    Respiratory WDL WDL        Skin Circulation/Temperature WDL    Skin Circulation/Temperature WDL WDL        Cardiac WDL    Cardiac WDL WDL        Peripheral/Neurovascular WDL    Peripheral Neurovascular WDL WDL        Cognitive/Neuro/Behavioral WDL    Cognitive/Neuro/Behavioral WDL WDL

## 2025-06-04 ENCOUNTER — HOSPITAL ENCOUNTER (EMERGENCY)
Facility: CLINIC | Age: 45
Discharge: HOME | End: 2025-06-04
Attending: EMERGENCY MEDICINE | Admitting: EMERGENCY MEDICINE
Payer: COMMERCIAL

## 2025-06-04 VITALS
SYSTOLIC BLOOD PRESSURE: 150 MMHG | HEART RATE: 84 BPM | OXYGEN SATURATION: 99 % | HEIGHT: 68 IN | TEMPERATURE: 98.1 F | WEIGHT: 175 LBS | BODY MASS INDEX: 26.52 KG/M2 | DIASTOLIC BLOOD PRESSURE: 98 MMHG | RESPIRATION RATE: 18 BRPM

## 2025-06-04 DIAGNOSIS — S93.491A SPRAIN OF ANTERIOR TALOFIBULAR LIGAMENT OF RIGHT ANKLE, INITIAL ENCOUNTER: ICD-10-CM

## 2025-06-04 PROCEDURE — 250N000013 HC RX MED GY IP 250 OP 250 PS 637: Performed by: EMERGENCY MEDICINE

## 2025-06-04 PROCEDURE — 99283 EMERGENCY DEPT VISIT LOW MDM: CPT | Performed by: EMERGENCY MEDICINE

## 2025-06-04 RX ORDER — IBUPROFEN 600 MG/1
600 TABLET, FILM COATED ORAL ONCE
Status: COMPLETED | OUTPATIENT
Start: 2025-06-04 | End: 2025-06-04

## 2025-06-04 RX ORDER — ACETAMINOPHEN 325 MG/1
975 TABLET ORAL ONCE
Status: COMPLETED | OUTPATIENT
Start: 2025-06-04 | End: 2025-06-04

## 2025-06-04 RX ADMIN — ACETAMINOPHEN 975 MG: 325 TABLET ORAL at 08:52

## 2025-06-04 ASSESSMENT — COLUMBIA-SUICIDE SEVERITY RATING SCALE - C-SSRS
1. IN THE PAST MONTH, HAVE YOU WISHED YOU WERE DEAD OR WISHED YOU COULD GO TO SLEEP AND NOT WAKE UP?: NO
6. HAVE YOU EVER DONE ANYTHING, STARTED TO DO ANYTHING, OR PREPARED TO DO ANYTHING TO END YOUR LIFE?: NO
2. HAVE YOU ACTUALLY HAD ANY THOUGHTS OF KILLING YOURSELF IN THE PAST MONTH?: NO

## 2025-06-04 ASSESSMENT — ACTIVITIES OF DAILY LIVING (ADL): ADLS_ACUITY_SCORE: 41

## 2025-06-04 NOTE — ED TRIAGE NOTES
"Coming down the stairs last evening and tripped on sons boots at the bottom step. States ankle rolled completely to side and he \"heard a snap\". Right ankle swollen and tender to the touch. Unable to bear weight on foot.     Triage Assessment (Adult)       Row Name 06/04/25 0844          Triage Assessment    Airway WDL WDL        Respiratory WDL    Respiratory WDL WDL        Skin Circulation/Temperature WDL    Skin Circulation/Temperature WDL X  rolled right ankle        Cardiac WDL    Cardiac WDL WDL        Peripheral/Neurovascular WDL    Peripheral Neurovascular WDL X;neurovascular assessment lower        Cognitive/Neuro/Behavioral WDL    Cognitive/Neuro/Behavioral WDL WDL        RLE Neurovascular Assessment    Temperature RLE warm     Color RLE no discoloration     Sensation RLE tenderness present                     "

## 2025-06-04 NOTE — ED PROVIDER NOTES
[facility] EMERGENCY DEPT  PHYSICIAN NOTE    MRN: [id number]    FINAL IMPRESSION     1. Sprain of anterior talofibular ligament of right ankle, initial encounter          ED COURSE & MDM     Patient presented for a right ankle injury.  Initial vital signs reassuring.  Patient's mechanism of injury is consistent with sprain of ATFL, and this is where he is maximally tender.  X-ray shows no fracture or dislocation.  He has no tenderness of the fibular head or distal to the hindfoot, so I am not suspicious for concomitant injuries.  Since he is unable to bear weight, we will provide a splint and crutches.  Patient advised to be weightbearing as tolerated.  We discussed supportive care at home and he understands.  Patient discharged in stable condition.  Return precautions provided.  All questions answered.    Medications Administered During This ED Encounter  Medications   acetaminophen (TYLENOL) tablet 975 mg (975 mg Oral $Given 6/4/25 0852)     Or   ibuprofen (ADVIL/MOTRIN) tablet 600 mg ( Oral See Alternative 6/4/25 0852)         ===================================================================    HPI     Kayy Vieyra is a 45 year old male with relevant PMH significant for CRPS presenting with a right ankle injury.  The patient states last night he was going on the stairs and tripped over his son's work boots, rolling his ankle to the side.  He had immediate pain and subsequent swelling, but he hoped it would get better overnight.  He is unable to bear weight on the ankle.  No other injuries.  He does have a history of multiple right ankle sprains in the past.    No relevant previous documentation in the patient's chart.    ROS  See HPI.    Problem list, medications, allergies, PMH, PSH, family history, and social history reviewed and updated as able in Epic.      PHYSICAL EXAM     Vitals:    06/04/25 0843   BP: (!) 150/98   Pulse: 84   Resp: 18   Temp: 98.1  F (36.7  C)   TempSrc: Oral   SpO2:  "99%   Weight: 79.4 kg (175 lb)   Height: 1.727 m (5' 8\")        Constitutional: Alert, no acute distress.  HENT: Normocephalic, atraumatic.  Neck: Supple, full ROM.  CV: DP pulses 2+ and symmetric. Distal capillary refill <2 sec.  Pulm: Non-labored respirations.  MSK: Tenderness to medial and lateral malleoli and ligaments. No obvious deformity. Focal swelling around right ankle joint, lateral greater than medial.  Neuro: A/O x3. Normal speech. Distal motor strength 5/5 in bilateral lower extremities. Sensation intact and symmetric in bilateral lower extremities.  Skin: Warm and dry, intact.      TESTING   All testing reviewed and independently interpreted.    EKG  None    LABS  Labs Ordered and Resulted from Time of ED Arrival to Time of ED Departure - No data to display    IMAGING  XR Ankle Right G/E 3 Views   Final Result   IMPRESSION: No acute fracture. Chronic well-corticated ossicle at the tip of the medial malleolus consistent with sequela of old trauma. Soft tissue swelling about the ankle. Ankle joint effusion.                 Pedro Luis Peoples MD  06/04/25 0939    "

## 2025-06-04 NOTE — DISCHARGE INSTRUCTIONS
Pain Management at Home    You can take acetaminophen (Tylenol) and/or ibuprofen (Motrin/Advil) as needed. They work in different ways and are safe to take together. If you use both, you can either take them at the same time (if you feel like one isn't doing enough) or staggered (if you feel like it works but is wearing off too fast). Take ibuprofen with food to avoid an upset stomach. You can also take famotidine (Pepcid) if you feel like the ibuprofen is giving you heartburn. If you prefer, you can substitute naproxen (Aleve/Naprosyn) for the ibuprofen. All of these medicines are available over the counter.    The dose of Tylenol you should take is either 975 mg (3 regular strength) or 1000 mg (2 extra strength) every 6 hours.  The dose of ibuprofen is 600 mg (3 tablets) every 6 hours    Another thing you can try is ice and/or heat. Ice typically works better in the first 48 hours of pain and heat typically works better after that, but you can try both to see which helps more.

## 2025-07-13 ENCOUNTER — HEALTH MAINTENANCE LETTER (OUTPATIENT)
Age: 45
End: 2025-07-13

## 2025-07-28 ENCOUNTER — OFFICE VISIT (OUTPATIENT)
Dept: FAMILY MEDICINE | Facility: CLINIC | Age: 45
End: 2025-07-28
Payer: COMMERCIAL

## 2025-07-28 VITALS
RESPIRATION RATE: 20 BRPM | SYSTOLIC BLOOD PRESSURE: 136 MMHG | WEIGHT: 171.7 LBS | DIASTOLIC BLOOD PRESSURE: 82 MMHG | TEMPERATURE: 97.5 F | OXYGEN SATURATION: 98 % | HEIGHT: 67 IN | BODY MASS INDEX: 26.95 KG/M2 | HEART RATE: 80 BPM

## 2025-07-28 DIAGNOSIS — R53.83 OTHER FATIGUE: ICD-10-CM

## 2025-07-28 DIAGNOSIS — M25.50 MULTIPLE JOINT PAIN: Primary | ICD-10-CM

## 2025-07-28 LAB
CRP SERPL-MCNC: 9.29 MG/L
ERYTHROCYTE [SEDIMENTATION RATE] IN BLOOD BY WESTERGREN METHOD: 9 MM/HR (ref 0–15)
RHEUMATOID FACT SERPL-ACNC: <10 IU/ML
TSH SERPL DL<=0.005 MIU/L-ACNC: 1.66 UIU/ML (ref 0.3–4.2)
VIT D+METAB SERPL-MCNC: 36 NG/ML (ref 20–50)

## 2025-07-28 PROCEDURE — 86431 RHEUMATOID FACTOR QUANT: CPT | Performed by: FAMILY MEDICINE

## 2025-07-28 PROCEDURE — 84443 ASSAY THYROID STIM HORMONE: CPT | Performed by: FAMILY MEDICINE

## 2025-07-28 PROCEDURE — 36415 COLL VENOUS BLD VENIPUNCTURE: CPT | Performed by: FAMILY MEDICINE

## 2025-07-28 PROCEDURE — 86140 C-REACTIVE PROTEIN: CPT | Performed by: FAMILY MEDICINE

## 2025-07-28 PROCEDURE — 1125F AMNT PAIN NOTED PAIN PRSNT: CPT | Performed by: FAMILY MEDICINE

## 2025-07-28 PROCEDURE — 84270 ASSAY OF SEX HORMONE GLOBUL: CPT | Performed by: FAMILY MEDICINE

## 2025-07-28 PROCEDURE — 82306 VITAMIN D 25 HYDROXY: CPT | Performed by: FAMILY MEDICINE

## 2025-07-28 PROCEDURE — 3079F DIAST BP 80-89 MM HG: CPT | Performed by: FAMILY MEDICINE

## 2025-07-28 PROCEDURE — 85652 RBC SED RATE AUTOMATED: CPT | Performed by: FAMILY MEDICINE

## 2025-07-28 PROCEDURE — 3075F SYST BP GE 130 - 139MM HG: CPT | Performed by: FAMILY MEDICINE

## 2025-07-28 PROCEDURE — 84403 ASSAY OF TOTAL TESTOSTERONE: CPT | Performed by: FAMILY MEDICINE

## 2025-07-28 PROCEDURE — 99213 OFFICE O/P EST LOW 20 MIN: CPT | Performed by: FAMILY MEDICINE

## 2025-07-28 ASSESSMENT — PAIN SCALES - GENERAL: PAINLEVEL_OUTOF10: SEVERE PAIN (8)

## 2025-07-28 ASSESSMENT — ENCOUNTER SYMPTOMS
HEMATOLOGIC/LYMPHATIC NEGATIVE: 1
ARTHRALGIAS: 1
RESPIRATORY NEGATIVE: 1
FATIGUE: 1
CARDIOVASCULAR NEGATIVE: 1
ALLERGIC/IMMUNOLOGIC NEGATIVE: 1
PSYCHIATRIC NEGATIVE: 1
GASTROINTESTINAL NEGATIVE: 1
NEUROLOGICAL NEGATIVE: 1
ENDOCRINE NEGATIVE: 1
EYES NEGATIVE: 1

## 2025-07-28 ASSESSMENT — PATIENT HEALTH QUESTIONNAIRE - PHQ9
SUM OF ALL RESPONSES TO PHQ QUESTIONS 1-9: 6
SUM OF ALL RESPONSES TO PHQ QUESTIONS 1-9: 6
10. IF YOU CHECKED OFF ANY PROBLEMS, HOW DIFFICULT HAVE THESE PROBLEMS MADE IT FOR YOU TO DO YOUR WORK, TAKE CARE OF THINGS AT HOME, OR GET ALONG WITH OTHER PEOPLE: NOT DIFFICULT AT ALL

## 2025-07-28 NOTE — PROGRESS NOTES
"  Assessment & Plan   Problem List Items Addressed This Visit    None  Visit Diagnoses         Multiple joint pain    -  Primary    Relevant Orders    Rheumatoid factor    CRP, inflammation    ESR: Erythrocyte sedimentation rate (Completed)      Other fatigue        Relevant Orders    TSH with free T4 reflex    Vitamin D Deficiency    Testosterone Bioavailable           Patient is a 45 yr old male here for fatigue and generalized joint pain. Labs ordered, patient will be notified of results.   Nicotine/Tobacco Cessation  He reports that he has been smoking cigarettes. He has never used smokeless tobacco.  Nicotine/Tobacco Cessation Plan  Self help information given to patient      BMI  Estimated body mass index is 26.95 kg/m  as calculated from the following:    Height as of this encounter: 1.7 m (5' 6.93\").    Weight as of this encounter: 77.9 kg (171 lb 11.2 oz).       Follow-up       Tonya Sultana is a 45 year old, presenting for the following health issues:    45 yr old male here for complaints of fatigue and joint pain. He says the fatigue has been ongoing for months to the point where he sometimes finds it hard to get off from his bed, he does struggle with chronic depression and he is on a lot of psychiatry medications which potentially could cause fatigue. He reports generalized joint pain especially hands and feet. He also has shoulder pain and elbow pain. He reports that his mom has a history of rheumatoid arthritis.       Hand Problem (swelling) and lethargic        7/28/2025    12:44 PM   Additional Questions   Roomed by Nereyda Benitez   Accompanied by self         7/28/2025    12:44 PM   Patient Reported Additional Medications   Patient reports taking the following new medications none   - Taking buspar 5mg once daily. Add to medication list.    Via the Health Maintenance questionnaire, the patient has reported the following services have been completed -Colonscopy: seven 2022-04-05, this " information has been sent to the abstraction team.  History of Present Illness       Reason for visit:  Joints hurt  Symptom onset:  More than a month  Symptoms include:  Pain  Symptom intensity:  Moderate  Symptom progression:  Worsening  Had these symptoms before:  No   He is taking medications regularly.        Acute Illness  Acute illness concerns: Lethargic  Onset/Duration: x 3 brinda months  Symptoms:  Fever: No  Chills/Sweats: No  Headache (location?): No  Sinus Pressure: No  Conjunctivitis:  No  Ear Pain: YES: right, pressure and pain  Rhinorrhea: No  Congestion: No  Sore Throat: No  Cough: no  Wheeze: YES- and SOB  Decreased Appetite: No  Nausea: No  Vomiting: No  Diarrhea: No  Dysuria/Freq.: No  Dysuria or Hematuria: No  Fatigue/Achiness: YES- very tired, low energy, having a hard time getting out of bed.  Sick/Strep Exposure: No  Therapies tried and outcome: None    Pain History: Arthritis   When did you first notice your pain? Ongoing intermittently. Worse in the last year.    Have you seen anyone else for your pain? No  How has your pain affected your ability to work? 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 Patient states he can't  thing like he use too.   Where in your body do you have pain? Musculoskeletal problem/pain  Onset/Duration: ongoing. Arthritis type pain. Patient states his mother has Rheumatoid Arthritis and he states he has the same symptoms as his mother.  Description  Location: Knuckles, wrist, elbows, shoulders and feet. (Joints)  Joint Swelling: YES  Redness: YES  Pain: YES  Warmth: YES  Intensity:  8/10, The more he is using or moving the more it hurts.  Progression of Symptoms:  worsening  Accompanying signs and symptoms:   Fevers: No  Numbness/tingling/weakness: YES- numbness and weakness  History  Trauma to the area: No  Recent illness:  No  Previous similar problem: YES- intermittently but was not as bad.  Previous  "evaluation:  No  Precipitating or alleviating factors:  Aggravating factors include: overuse  Therapies tried and outcome: Aleve and ice- subside the pain for a short time          Review of Systems   Constitutional:  Positive for fatigue.   HENT: Negative.     Eyes: Negative.    Respiratory: Negative.     Cardiovascular: Negative.    Gastrointestinal: Negative.    Endocrine: Negative.    Genitourinary: Negative.    Musculoskeletal:  Positive for arthralgias.   Allergic/Immunologic: Negative.    Neurological: Negative.    Hematological: Negative.    Psychiatric/Behavioral: Negative.            Objective    /82 (BP Location: Right arm, Patient Position: Sitting, Cuff Size: Adult Large)   Pulse 80   Temp 97.5  F (36.4  C) (Tympanic)   Resp 20   Ht 1.7 m (5' 6.93\")   Wt 77.9 kg (171 lb 11.2 oz)   SpO2 98%   BMI 26.95 kg/m    Body mass index is 26.95 kg/m .  Physical Exam   GENERAL: alert and no distress  EYES: Eyes grossly normal to inspection, PERRL and conjunctivae and sclerae normal  HENT: ear canals and TM's normal, nose and mouth without ulcers or lesions  NECK: no adenopathy, no asymmetry, masses, or scars  RESP: lungs clear to auscultation - no rales, rhonchi or wheezes  CV: regular rate and rhythm, normal S1 S2, no S3 or S4, no murmur, click or rub, no peripheral edema  MS: no gross musculoskeletal defects noted, no edema    Labs pending        Signed Electronically by: Melida Sherwood MD    "

## 2025-07-29 LAB — SHBG SERPL-SCNC: 30 NMOL/L (ref 11–80)

## 2025-07-31 LAB
TESTOST FREE SERPL-MCNC: 6.61 NG/DL
TESTOST SERPL-MCNC: 316 NG/DL (ref 240–950)

## 2025-08-25 ENCOUNTER — APPOINTMENT (OUTPATIENT)
Dept: MRI IMAGING | Facility: CLINIC | Age: 45
End: 2025-08-25
Attending: EMERGENCY MEDICINE
Payer: COMMERCIAL

## 2025-08-25 ENCOUNTER — HOSPITAL ENCOUNTER (EMERGENCY)
Facility: CLINIC | Age: 45
Discharge: HOME | End: 2025-08-25
Attending: EMERGENCY MEDICINE | Admitting: EMERGENCY MEDICINE
Payer: COMMERCIAL

## 2025-08-25 VITALS
OXYGEN SATURATION: 95 % | SYSTOLIC BLOOD PRESSURE: 130 MMHG | WEIGHT: 170 LBS | BODY MASS INDEX: 26.68 KG/M2 | HEART RATE: 84 BPM | TEMPERATURE: 98 F | DIASTOLIC BLOOD PRESSURE: 90 MMHG | RESPIRATION RATE: 14 BRPM

## 2025-08-25 DIAGNOSIS — R07.9 CHEST PAIN, UNSPECIFIED TYPE: Primary | ICD-10-CM

## 2025-08-25 DIAGNOSIS — R20.2 PARESTHESIA OF LEFT ARM: ICD-10-CM

## 2025-08-25 LAB
ANION GAP SERPL CALCULATED.3IONS-SCNC: 11 MMOL/L (ref 7–15)
BASOPHILS # BLD AUTO: 0.08 10E3/UL (ref 0–0.2)
BASOPHILS NFR BLD AUTO: 0.7 %
BUN SERPL-MCNC: 9.5 MG/DL (ref 6–20)
CALCIUM SERPL-MCNC: 9.2 MG/DL (ref 8.8–10.4)
CHLORIDE SERPL-SCNC: 100 MMOL/L (ref 98–107)
CREAT SERPL-MCNC: 0.66 MG/DL (ref 0.67–1.17)
EGFRCR SERPLBLD CKD-EPI 2021: >90 ML/MIN/1.73M2
EOSINOPHIL # BLD AUTO: 0.55 10E3/UL (ref 0–0.7)
EOSINOPHIL NFR BLD AUTO: 4.8 %
ERYTHROCYTE [DISTWIDTH] IN BLOOD BY AUTOMATED COUNT: 12.8 % (ref 10–15)
GLUCOSE SERPL-MCNC: 129 MG/DL (ref 70–99)
HCO3 SERPL-SCNC: 25 MMOL/L (ref 22–29)
HCT VFR BLD AUTO: 42.6 % (ref 40–53)
HGB BLD-MCNC: 15 G/DL (ref 13.3–17.7)
HOLD SPECIMEN: NORMAL
IMM GRANULOCYTES # BLD: 0.05 10E3/UL
IMM GRANULOCYTES NFR BLD: 0.4 %
LYMPHOCYTES # BLD AUTO: 4.14 10E3/UL (ref 0.8–5.3)
LYMPHOCYTES NFR BLD AUTO: 35.9 %
MCH RBC QN AUTO: 32.7 PG (ref 26.5–33)
MCHC RBC AUTO-ENTMCNC: 35.2 G/DL (ref 31.5–36.5)
MCV RBC AUTO: 92.8 FL (ref 78–100)
MONOCYTES # BLD AUTO: 0.89 10E3/UL (ref 0–1.3)
MONOCYTES NFR BLD AUTO: 7.7 %
NEUTROPHILS # BLD AUTO: 5.81 10E3/UL (ref 1.6–8.3)
NEUTROPHILS NFR BLD AUTO: 50.5 %
NRBC # BLD AUTO: <0.03 10E3/UL
NRBC BLD AUTO-RTO: 0 /100
PLATELET # BLD AUTO: 349 10E3/UL (ref 150–450)
POTASSIUM SERPL-SCNC: 4.8 MMOL/L (ref 3.4–5.3)
RBC # BLD AUTO: 4.59 10E6/UL (ref 4.4–5.9)
SODIUM SERPL-SCNC: 136 MMOL/L (ref 135–145)
TROPONIN T SERPL HS-MCNC: <6 NG/L
WBC # BLD AUTO: 11.52 10E3/UL (ref 4–11)

## 2025-08-25 PROCEDURE — 99285 EMERGENCY DEPT VISIT HI MDM: CPT | Mod: 25 | Performed by: EMERGENCY MEDICINE

## 2025-08-25 PROCEDURE — 70544 MR ANGIOGRAPHY HEAD W/O DYE: CPT

## 2025-08-25 PROCEDURE — A9585 GADOBUTROL INJECTION: HCPCS | Performed by: EMERGENCY MEDICINE

## 2025-08-25 PROCEDURE — 255N000002 HC RX 255 OP 636: Performed by: EMERGENCY MEDICINE

## 2025-08-25 PROCEDURE — 70553 MRI BRAIN STEM W/O & W/DYE: CPT

## 2025-08-25 PROCEDURE — 36415 COLL VENOUS BLD VENIPUNCTURE: CPT | Performed by: EMERGENCY MEDICINE

## 2025-08-25 PROCEDURE — 93005 ELECTROCARDIOGRAM TRACING: CPT

## 2025-08-25 PROCEDURE — 80048 BASIC METABOLIC PNL TOTAL CA: CPT | Performed by: EMERGENCY MEDICINE

## 2025-08-25 PROCEDURE — 84484 ASSAY OF TROPONIN QUANT: CPT | Performed by: EMERGENCY MEDICINE

## 2025-08-25 PROCEDURE — 85004 AUTOMATED DIFF WBC COUNT: CPT | Performed by: EMERGENCY MEDICINE

## 2025-08-25 PROCEDURE — 70549 MR ANGIOGRAPH NECK W/O&W/DYE: CPT

## 2025-08-25 RX ORDER — GADOBUTROL 604.72 MG/ML
10 INJECTION INTRAVENOUS ONCE
Status: COMPLETED | OUTPATIENT
Start: 2025-08-25 | End: 2025-08-25

## 2025-08-25 RX ADMIN — GADOBUTROL 10 ML: 604.72 INJECTION INTRAVENOUS at 17:55

## 2025-08-25 ASSESSMENT — ACTIVITIES OF DAILY LIVING (ADL)
ADLS_ACUITY_SCORE: 41

## 2025-08-27 LAB
ATRIAL RATE - MUSE: 78 BPM
DIASTOLIC BLOOD PRESSURE - MUSE: NORMAL MMHG
INTERPRETATION ECG - MUSE: NORMAL
P AXIS - MUSE: 51 DEGREES
PR INTERVAL - MUSE: 130 MS
QRS DURATION - MUSE: 86 MS
QT - MUSE: 370 MS
QTC - MUSE: 421 MS
R AXIS - MUSE: 33 DEGREES
SYSTOLIC BLOOD PRESSURE - MUSE: NORMAL MMHG
T AXIS - MUSE: 45 DEGREES
VENTRICULAR RATE- MUSE: 78 BPM